# Patient Record
Sex: MALE | Race: WHITE | Employment: OTHER | URBAN - METROPOLITAN AREA
[De-identification: names, ages, dates, MRNs, and addresses within clinical notes are randomized per-mention and may not be internally consistent; named-entity substitution may affect disease eponyms.]

---

## 2017-08-07 ENCOUNTER — HOSPITAL ENCOUNTER (EMERGENCY)
Age: 42
Discharge: HOME | End: 2017-08-07
Attending: EMERGENCY MEDICINE
Payer: MEDICAID

## 2017-08-07 ENCOUNTER — APPOINTMENT (OUTPATIENT)
Dept: GENERAL RADIOLOGY | Age: 42
End: 2017-08-07
Payer: MEDICAID

## 2017-08-07 VITALS
RESPIRATION RATE: 16 BRPM | WEIGHT: 213 LBS | OXYGEN SATURATION: 98 % | DIASTOLIC BLOOD PRESSURE: 88 MMHG | HEIGHT: 70 IN | BODY MASS INDEX: 30.49 KG/M2 | TEMPERATURE: 98.2 F | HEART RATE: 63 BPM | SYSTOLIC BLOOD PRESSURE: 113 MMHG

## 2017-08-07 DIAGNOSIS — S62.102A LEFT WRIST FRACTURE, CLOSED, INITIAL ENCOUNTER: Primary | ICD-10-CM

## 2017-08-07 PROCEDURE — 99283 EMERGENCY DEPT VISIT LOW MDM: CPT

## 2017-08-07 PROCEDURE — 73110 X-RAY EXAM OF WRIST: CPT

## 2017-08-07 PROCEDURE — 29125 APPL SHORT ARM SPLINT STATIC: CPT

## 2017-08-07 RX ORDER — IBUPROFEN 800 MG/1
800 TABLET ORAL EVERY 8 HOURS PRN
Qty: 30 TABLET | Refills: 0 | Status: SHIPPED | OUTPATIENT
Start: 2017-08-07 | End: 2018-06-20 | Stop reason: SDUPTHER

## 2017-08-07 ASSESSMENT — PAIN SCALES - GENERAL: PAINLEVEL_OUTOF10: 7

## 2017-08-07 ASSESSMENT — PAIN DESCRIPTION - DESCRIPTORS: DESCRIPTORS: ACHING

## 2017-08-07 ASSESSMENT — PAIN DESCRIPTION - PAIN TYPE: TYPE: ACUTE PAIN

## 2017-08-07 ASSESSMENT — PAIN DESCRIPTION - FREQUENCY: FREQUENCY: CONTINUOUS

## 2017-08-07 ASSESSMENT — PAIN DESCRIPTION - ORIENTATION: ORIENTATION: LEFT

## 2017-08-07 ASSESSMENT — PAIN DESCRIPTION - LOCATION: LOCATION: WRIST

## 2018-04-30 ENCOUNTER — APPOINTMENT (OUTPATIENT)
Dept: CT IMAGING | Age: 43
End: 2018-04-30
Payer: MEDICAID

## 2018-04-30 ENCOUNTER — HOSPITAL ENCOUNTER (EMERGENCY)
Age: 43
Discharge: HOME | End: 2018-04-30
Attending: EMERGENCY MEDICINE
Payer: MEDICAID

## 2018-04-30 VITALS
WEIGHT: 200 LBS | SYSTOLIC BLOOD PRESSURE: 142 MMHG | OXYGEN SATURATION: 96 % | DIASTOLIC BLOOD PRESSURE: 76 MMHG | BODY MASS INDEX: 28.63 KG/M2 | RESPIRATION RATE: 18 BRPM | HEART RATE: 65 BPM | TEMPERATURE: 98 F | HEIGHT: 70 IN

## 2018-04-30 DIAGNOSIS — R19.7 NAUSEA VOMITING AND DIARRHEA: Primary | ICD-10-CM

## 2018-04-30 DIAGNOSIS — R11.2 NAUSEA VOMITING AND DIARRHEA: Primary | ICD-10-CM

## 2018-04-30 DIAGNOSIS — R10.9 ABDOMINAL PAIN, UNSPECIFIED ABDOMINAL LOCATION: ICD-10-CM

## 2018-04-30 LAB
ABSOLUTE EOS #: 0.1 K/UL (ref 0–0.4)
ABSOLUTE IMMATURE GRANULOCYTE: ABNORMAL K/UL (ref 0–0.3)
ABSOLUTE LYMPH #: 1.2 K/UL (ref 1–4.8)
ABSOLUTE MONO #: 0.7 K/UL (ref 0.1–1.3)
ALBUMIN SERPL-MCNC: 4 G/DL (ref 3.5–5.2)
ALBUMIN/GLOBULIN RATIO: ABNORMAL (ref 1–2.5)
ALP BLD-CCNC: 78 U/L (ref 40–129)
ALT SERPL-CCNC: 26 U/L (ref 5–41)
ANION GAP SERPL CALCULATED.3IONS-SCNC: 11 MMOL/L (ref 9–17)
AST SERPL-CCNC: 22 U/L
BASOPHILS # BLD: 1 % (ref 0–2)
BASOPHILS ABSOLUTE: 0 K/UL (ref 0–0.2)
BILIRUB SERPL-MCNC: 0.19 MG/DL (ref 0.3–1.2)
BUN BLDV-MCNC: 15 MG/DL (ref 6–20)
BUN/CREAT BLD: ABNORMAL (ref 9–20)
CALCIUM SERPL-MCNC: 8.7 MG/DL (ref 8.6–10.4)
CHLORIDE BLD-SCNC: 101 MMOL/L (ref 98–107)
CO2: 24 MMOL/L (ref 20–31)
CREAT SERPL-MCNC: 0.79 MG/DL (ref 0.7–1.2)
DIFFERENTIAL TYPE: ABNORMAL
EOSINOPHILS RELATIVE PERCENT: 1 % (ref 0–4)
GFR AFRICAN AMERICAN: >60 ML/MIN
GFR NON-AFRICAN AMERICAN: >60 ML/MIN
GFR SERPL CREATININE-BSD FRML MDRD: ABNORMAL ML/MIN/{1.73_M2}
GFR SERPL CREATININE-BSD FRML MDRD: ABNORMAL ML/MIN/{1.73_M2}
GLUCOSE BLD-MCNC: 125 MG/DL (ref 70–99)
HCT VFR BLD CALC: 48.6 % (ref 41–53)
HEMOGLOBIN: 17.1 G/DL (ref 13.5–17.5)
IMMATURE GRANULOCYTES: ABNORMAL %
LIPASE: 17 U/L (ref 13–60)
LYMPHOCYTES # BLD: 16 % (ref 24–44)
MCH RBC QN AUTO: 33.2 PG (ref 26–34)
MCHC RBC AUTO-ENTMCNC: 35.3 G/DL (ref 31–37)
MCV RBC AUTO: 94.1 FL (ref 80–100)
MONOCYTES # BLD: 10 % (ref 1–7)
NRBC AUTOMATED: ABNORMAL PER 100 WBC
PDW BLD-RTO: 12.9 % (ref 11.5–14.9)
PLATELET # BLD: 171 K/UL (ref 150–450)
PLATELET ESTIMATE: ABNORMAL
PMV BLD AUTO: 10.1 FL (ref 6–12)
POTASSIUM SERPL-SCNC: 3.4 MMOL/L (ref 3.7–5.3)
RBC # BLD: 5.16 M/UL (ref 4.5–5.9)
RBC # BLD: ABNORMAL 10*6/UL
SEG NEUTROPHILS: 72 % (ref 36–66)
SEGMENTED NEUTROPHILS ABSOLUTE COUNT: 5.4 K/UL (ref 1.3–9.1)
SODIUM BLD-SCNC: 136 MMOL/L (ref 135–144)
TOTAL PROTEIN: 7.3 G/DL (ref 6.4–8.3)
WBC # BLD: 7.3 K/UL (ref 3.5–11)
WBC # BLD: ABNORMAL 10*3/UL

## 2018-04-30 PROCEDURE — 6360000002 HC RX W HCPCS: Performed by: EMERGENCY MEDICINE

## 2018-04-30 PROCEDURE — 96374 THER/PROPH/DIAG INJ IV PUSH: CPT

## 2018-04-30 PROCEDURE — 36415 COLL VENOUS BLD VENIPUNCTURE: CPT

## 2018-04-30 PROCEDURE — 83690 ASSAY OF LIPASE: CPT

## 2018-04-30 PROCEDURE — 99284 EMERGENCY DEPT VISIT MOD MDM: CPT

## 2018-04-30 PROCEDURE — 85025 COMPLETE CBC W/AUTO DIFF WBC: CPT

## 2018-04-30 PROCEDURE — 74176 CT ABD & PELVIS W/O CONTRAST: CPT

## 2018-04-30 PROCEDURE — 80053 COMPREHEN METABOLIC PANEL: CPT

## 2018-04-30 PROCEDURE — 96375 TX/PRO/DX INJ NEW DRUG ADDON: CPT

## 2018-04-30 RX ORDER — ONDANSETRON 4 MG/1
4 TABLET, ORALLY DISINTEGRATING ORAL EVERY 8 HOURS PRN
Qty: 10 TABLET | Refills: 0 | Status: SHIPPED | OUTPATIENT
Start: 2018-04-30

## 2018-04-30 RX ORDER — DICYCLOMINE HCL 20 MG
20 TABLET ORAL EVERY 6 HOURS
Qty: 15 TABLET | Refills: 0 | Status: SHIPPED | OUTPATIENT
Start: 2018-04-30

## 2018-04-30 RX ORDER — FUROSEMIDE 20 MG/1
20 TABLET ORAL DAILY
COMMUNITY

## 2018-04-30 RX ORDER — DICYCLOMINE HYDROCHLORIDE 10 MG/ML
20 INJECTION INTRAMUSCULAR ONCE
Status: COMPLETED | OUTPATIENT
Start: 2018-04-30 | End: 2018-04-30

## 2018-04-30 RX ORDER — PHENYTOIN SODIUM 100 MG/1
100 CAPSULE, EXTENDED RELEASE ORAL 2 TIMES DAILY
COMMUNITY
End: 2019-05-24

## 2018-04-30 RX ORDER — KETOROLAC TROMETHAMINE 30 MG/ML
30 INJECTION, SOLUTION INTRAMUSCULAR; INTRAVENOUS ONCE
Status: COMPLETED | OUTPATIENT
Start: 2018-04-30 | End: 2018-04-30

## 2018-04-30 RX ORDER — DIVALPROEX SODIUM 500 MG/1
500 TABLET, DELAYED RELEASE ORAL 2 TIMES DAILY
COMMUNITY
End: 2019-05-24 | Stop reason: SDUPTHER

## 2018-04-30 RX ADMIN — DICYCLOMINE HYDROCHLORIDE 20 MG: 20 INJECTION, SOLUTION INTRAMUSCULAR at 15:33

## 2018-04-30 RX ADMIN — KETOROLAC TROMETHAMINE 30 MG: 30 INJECTION, SOLUTION INTRAMUSCULAR at 15:33

## 2018-04-30 ASSESSMENT — PAIN DESCRIPTION - LOCATION: LOCATION: ABDOMEN

## 2018-04-30 ASSESSMENT — ENCOUNTER SYMPTOMS
SHORTNESS OF BREATH: 0
EYE DISCHARGE: 0
COUGH: 0
EYE REDNESS: 0
ABDOMINAL PAIN: 1
RHINORRHEA: 0
VOMITING: 0
DIARRHEA: 1
EYE PAIN: 0
BACK PAIN: 0

## 2018-04-30 ASSESSMENT — PAIN DESCRIPTION - PAIN TYPE: TYPE: ACUTE PAIN

## 2018-04-30 ASSESSMENT — PAIN SCALES - GENERAL
PAINLEVEL_OUTOF10: 0
PAINLEVEL_OUTOF10: 10
PAINLEVEL_OUTOF10: 10

## 2018-06-19 ENCOUNTER — HOSPITAL ENCOUNTER (EMERGENCY)
Age: 43
Discharge: HOME | End: 2018-06-20
Attending: EMERGENCY MEDICINE
Payer: MEDICAID

## 2018-06-19 ENCOUNTER — APPOINTMENT (OUTPATIENT)
Dept: GENERAL RADIOLOGY | Age: 43
End: 2018-06-19
Payer: MEDICAID

## 2018-06-19 VITALS
HEIGHT: 70 IN | HEART RATE: 66 BPM | DIASTOLIC BLOOD PRESSURE: 67 MMHG | SYSTOLIC BLOOD PRESSURE: 122 MMHG | BODY MASS INDEX: 28.63 KG/M2 | TEMPERATURE: 98.2 F | OXYGEN SATURATION: 98 % | RESPIRATION RATE: 16 BRPM | WEIGHT: 200 LBS

## 2018-06-19 DIAGNOSIS — S63.259A DISLOCATION OF FINGER, INITIAL ENCOUNTER: Primary | ICD-10-CM

## 2018-06-19 PROCEDURE — 73140 X-RAY EXAM OF FINGER(S): CPT

## 2018-06-19 PROCEDURE — 26770 TREAT FINGER DISLOCATION: CPT

## 2018-06-19 PROCEDURE — 99283 EMERGENCY DEPT VISIT LOW MDM: CPT

## 2018-06-19 RX ORDER — IBUPROFEN 600 MG/1
600 TABLET ORAL EVERY 6 HOURS PRN
Status: DISCONTINUED | OUTPATIENT
Start: 2018-06-19 | End: 2018-06-20 | Stop reason: HOSPADM

## 2018-06-19 RX ORDER — HYDROCODONE BITARTRATE AND ACETAMINOPHEN 5; 325 MG/1; MG/1
1 TABLET ORAL ONCE
Status: COMPLETED | OUTPATIENT
Start: 2018-06-19 | End: 2018-06-20

## 2018-06-19 ASSESSMENT — PAIN DESCRIPTION - ORIENTATION: ORIENTATION: RIGHT

## 2018-06-19 ASSESSMENT — PAIN DESCRIPTION - LOCATION: LOCATION: FINGER (COMMENT WHICH ONE)

## 2018-06-20 ENCOUNTER — APPOINTMENT (OUTPATIENT)
Dept: GENERAL RADIOLOGY | Age: 43
End: 2018-06-20
Payer: MEDICAID

## 2018-06-20 PROCEDURE — 6370000000 HC RX 637 (ALT 250 FOR IP): Performed by: EMERGENCY MEDICINE

## 2018-06-20 PROCEDURE — 73140 X-RAY EXAM OF FINGER(S): CPT

## 2018-06-20 PROCEDURE — 2500000003 HC RX 250 WO HCPCS: Performed by: EMERGENCY MEDICINE

## 2018-06-20 RX ORDER — LIDOCAINE HYDROCHLORIDE 20 MG/ML
5 INJECTION, SOLUTION INFILTRATION; PERINEURAL ONCE
Status: COMPLETED | OUTPATIENT
Start: 2018-06-20 | End: 2018-06-20

## 2018-06-20 RX ORDER — IBUPROFEN 600 MG/1
600 TABLET ORAL EVERY 6 HOURS PRN
Qty: 40 TABLET | Refills: 0 | Status: SHIPPED | OUTPATIENT
Start: 2018-06-20 | End: 2021-04-10

## 2018-06-20 RX ADMIN — IBUPROFEN 600 MG: 600 TABLET ORAL at 00:16

## 2018-06-20 RX ADMIN — HYDROCODONE BITARTRATE AND ACETAMINOPHEN 1 TABLET: 5; 325 TABLET ORAL at 00:16

## 2018-06-20 RX ADMIN — LIDOCAINE HYDROCHLORIDE 5 ML: 20 INJECTION, SOLUTION INFILTRATION; PERINEURAL at 00:22

## 2018-06-20 ASSESSMENT — PAIN SCALES - GENERAL
PAINLEVEL_OUTOF10: 8
PAINLEVEL_OUTOF10: 2

## 2019-03-19 ENCOUNTER — HOSPITAL ENCOUNTER (OUTPATIENT)
Age: 44
Setting detail: SPECIMEN
Discharge: HOME | End: 2019-03-19
Payer: MEDICAID

## 2019-03-19 LAB
ABSOLUTE EOS #: 0.16 K/UL (ref 0–0.44)
ABSOLUTE IMMATURE GRANULOCYTE: <0.03 K/UL (ref 0–0.3)
ABSOLUTE LYMPH #: 1.76 K/UL (ref 1.1–3.7)
ABSOLUTE MONO #: 0.63 K/UL (ref 0.1–1.2)
BASOPHILS # BLD: 1 % (ref 0–2)
BASOPHILS ABSOLUTE: 0.03 K/UL (ref 0–0.2)
DIFFERENTIAL TYPE: NORMAL
EOSINOPHILS RELATIVE PERCENT: 3 % (ref 1–4)
HCT VFR BLD CALC: 46 % (ref 40.7–50.3)
HEMOGLOBIN: 15.3 G/DL (ref 13–17)
IMMATURE GRANULOCYTES: 0 %
LYMPHOCYTES # BLD: 29 % (ref 24–43)
MCH RBC QN AUTO: 32.1 PG (ref 25.2–33.5)
MCHC RBC AUTO-ENTMCNC: 33.3 G/DL (ref 28.4–34.8)
MCV RBC AUTO: 96.4 FL (ref 82.6–102.9)
MONOCYTES # BLD: 11 % (ref 3–12)
NRBC AUTOMATED: 0 PER 100 WBC
PDW BLD-RTO: 12.3 % (ref 11.8–14.4)
PLATELET # BLD: 187 K/UL (ref 138–453)
PLATELET ESTIMATE: NORMAL
PMV BLD AUTO: 12 FL (ref 8.1–13.5)
RBC # BLD: 4.77 M/UL (ref 4.21–5.77)
RBC # BLD: NORMAL 10*6/UL
SEG NEUTROPHILS: 56 % (ref 36–65)
SEGMENTED NEUTROPHILS ABSOLUTE COUNT: 3.39 K/UL (ref 1.5–8.1)
WBC # BLD: 6 K/UL (ref 3.5–11.3)
WBC # BLD: NORMAL 10*3/UL

## 2019-03-20 LAB
ALBUMIN SERPL-MCNC: 4.1 G/DL (ref 3.5–5.2)
ALBUMIN/GLOBULIN RATIO: 1.4 (ref 1–2.5)
ALP BLD-CCNC: 88 U/L (ref 40–129)
ALT SERPL-CCNC: 23 U/L (ref 5–41)
ANION GAP SERPL CALCULATED.3IONS-SCNC: 11 MMOL/L (ref 9–17)
AST SERPL-CCNC: 32 U/L
BILIRUB SERPL-MCNC: 0.27 MG/DL (ref 0.3–1.2)
BUN BLDV-MCNC: 19 MG/DL (ref 6–20)
BUN/CREAT BLD: ABNORMAL (ref 9–20)
CALCIUM SERPL-MCNC: 8.9 MG/DL (ref 8.6–10.4)
CHLORIDE BLD-SCNC: 106 MMOL/L (ref 98–107)
CHOLESTEROL/HDL RATIO: 4.4
CHOLESTEROL: 193 MG/DL
CO2: 22 MMOL/L (ref 20–31)
CREAT SERPL-MCNC: 0.72 MG/DL (ref 0.7–1.2)
ESTIMATED AVERAGE GLUCOSE: 105 MG/DL
GFR AFRICAN AMERICAN: >60 ML/MIN
GFR NON-AFRICAN AMERICAN: >60 ML/MIN
GFR SERPL CREATININE-BSD FRML MDRD: ABNORMAL ML/MIN/{1.73_M2}
GFR SERPL CREATININE-BSD FRML MDRD: ABNORMAL ML/MIN/{1.73_M2}
GLUCOSE BLD-MCNC: 94 MG/DL (ref 70–99)
HBA1C MFR BLD: 5.3 % (ref 4–6)
HDLC SERPL-MCNC: 44 MG/DL
LDL CHOLESTEROL: 133 MG/DL (ref 0–130)
PHENYTOIN DATE LAST DOSE: ABNORMAL
PHENYTOIN DOSE AMOUNT: ABNORMAL
PHENYTOIN DOSE TIME: ABNORMAL
PHENYTOIN FREE: <0.1 UG/ML (ref 1–2)
PHENYTOIN LEVEL: 2.2 UG/ML (ref 10–20)
POTASSIUM SERPL-SCNC: 4.6 MMOL/L (ref 3.7–5.3)
SODIUM BLD-SCNC: 139 MMOL/L (ref 135–144)
TOTAL PROTEIN: 7.1 G/DL (ref 6.4–8.3)
TRIGL SERPL-MCNC: 78 MG/DL
VLDLC SERPL CALC-MCNC: ABNORMAL MG/DL (ref 1–30)

## 2019-03-21 LAB — MAGNESIUM: 2.3 MG/DL (ref 1.6–2.6)

## 2019-05-06 ENCOUNTER — HOSPITAL ENCOUNTER (OUTPATIENT)
Age: 44
Discharge: HOME | End: 2019-05-06
Payer: MEDICAID

## 2019-05-06 LAB
PHENYTOIN DATE LAST DOSE: ABNORMAL
PHENYTOIN DOSE AMOUNT: ABNORMAL
PHENYTOIN DOSE TIME: ABNORMAL
PHENYTOIN FREE: 0.4 UG/ML (ref 1–2)
PHENYTOIN LEVEL: 3.3 UG/ML (ref 10–20)

## 2019-05-06 PROCEDURE — 80186 ASSAY OF PHENYTOIN FREE: CPT

## 2019-05-06 PROCEDURE — 80185 ASSAY OF PHENYTOIN TOTAL: CPT

## 2019-05-17 ENCOUNTER — APPOINTMENT (OUTPATIENT)
Dept: GENERAL RADIOLOGY | Age: 44
End: 2019-05-17
Payer: MEDICAID

## 2019-05-17 ENCOUNTER — HOSPITAL ENCOUNTER (EMERGENCY)
Age: 44
Discharge: HOME | End: 2019-05-17
Attending: EMERGENCY MEDICINE
Payer: MEDICAID

## 2019-05-17 VITALS
HEART RATE: 60 BPM | RESPIRATION RATE: 18 BRPM | WEIGHT: 182 LBS | OXYGEN SATURATION: 96 % | BODY MASS INDEX: 26.05 KG/M2 | HEIGHT: 70 IN | DIASTOLIC BLOOD PRESSURE: 80 MMHG | SYSTOLIC BLOOD PRESSURE: 120 MMHG | TEMPERATURE: 98 F

## 2019-05-17 DIAGNOSIS — R07.81 RIB PAIN ON RIGHT SIDE: Primary | ICD-10-CM

## 2019-05-17 PROCEDURE — 71101 X-RAY EXAM UNILAT RIBS/CHEST: CPT

## 2019-05-17 PROCEDURE — 6370000000 HC RX 637 (ALT 250 FOR IP): Performed by: EMERGENCY MEDICINE

## 2019-05-17 PROCEDURE — 99283 EMERGENCY DEPT VISIT LOW MDM: CPT

## 2019-05-17 RX ORDER — IBUPROFEN 800 MG/1
800 TABLET ORAL EVERY 8 HOURS PRN
Qty: 30 TABLET | Refills: 0 | Status: SHIPPED | OUTPATIENT
Start: 2019-05-17 | End: 2021-04-10

## 2019-05-17 RX ORDER — ACETAMINOPHEN 325 MG/1
650 TABLET ORAL EVERY 6 HOURS PRN
Qty: 30 TABLET | Refills: 0 | Status: SHIPPED | OUTPATIENT
Start: 2019-05-17 | End: 2021-04-10

## 2019-05-17 RX ORDER — ACETAMINOPHEN 325 MG/1
650 TABLET ORAL ONCE
Status: COMPLETED | OUTPATIENT
Start: 2019-05-17 | End: 2019-05-17

## 2019-05-17 RX ORDER — IBUPROFEN 800 MG/1
800 TABLET ORAL ONCE
Status: COMPLETED | OUTPATIENT
Start: 2019-05-17 | End: 2019-05-17

## 2019-05-17 RX ADMIN — IBUPROFEN 800 MG: 800 TABLET ORAL at 04:29

## 2019-05-17 RX ADMIN — ACETAMINOPHEN 650 MG: 325 TABLET ORAL at 04:29

## 2019-05-17 ASSESSMENT — PAIN DESCRIPTION - ORIENTATION: ORIENTATION: RIGHT

## 2019-05-17 ASSESSMENT — PAIN SCALES - GENERAL
PAINLEVEL_OUTOF10: 9
PAINLEVEL_OUTOF10: 8
PAINLEVEL_OUTOF10: 10
PAINLEVEL_OUTOF10: 10

## 2019-05-17 ASSESSMENT — ENCOUNTER SYMPTOMS
BACK PAIN: 0
COLOR CHANGE: 0
NAUSEA: 0
VOMITING: 0
CHEST TIGHTNESS: 0
ABDOMINAL PAIN: 0
SHORTNESS OF BREATH: 0

## 2019-05-17 ASSESSMENT — PAIN - FUNCTIONAL ASSESSMENT: PAIN_FUNCTIONAL_ASSESSMENT: 0-10

## 2019-05-17 ASSESSMENT — PAIN DESCRIPTION - PAIN TYPE: TYPE: ACUTE PAIN

## 2019-05-17 ASSESSMENT — PAIN DESCRIPTION - LOCATION: LOCATION: RIB CAGE
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
## 2019-05-24 ENCOUNTER — OFFICE VISIT (OUTPATIENT)
Dept: NEUROLOGY | Age: 44
End: 2019-05-24
Payer: MEDICAID

## 2019-05-24 VITALS
BODY MASS INDEX: 27.55 KG/M2 | DIASTOLIC BLOOD PRESSURE: 84 MMHG | HEART RATE: 88 BPM | SYSTOLIC BLOOD PRESSURE: 136 MMHG | WEIGHT: 192 LBS

## 2019-05-24 DIAGNOSIS — G40.909 SEIZURE DISORDER (HCC): Primary | ICD-10-CM

## 2019-05-24 DIAGNOSIS — F17.200 SMOKER: ICD-10-CM

## 2019-05-24 DIAGNOSIS — Z91.14 NON COMPLIANCE W MEDICATION REGIMEN: ICD-10-CM

## 2019-05-24 DIAGNOSIS — F32.9 MAJOR DEPRESSIVE DISORDER WITH CURRENT ACTIVE EPISODE, UNSPECIFIED DEPRESSION EPISODE SEVERITY, UNSPECIFIED WHETHER RECURRENT: ICD-10-CM

## 2019-05-24 PROCEDURE — 99205 OFFICE O/P NEW HI 60 MIN: CPT | Performed by: STUDENT IN AN ORGANIZED HEALTH CARE EDUCATION/TRAINING PROGRAM

## 2019-05-24 PROCEDURE — G8427 DOCREV CUR MEDS BY ELIG CLIN: HCPCS | Performed by: STUDENT IN AN ORGANIZED HEALTH CARE EDUCATION/TRAINING PROGRAM

## 2019-05-24 PROCEDURE — 4004F PT TOBACCO SCREEN RCVD TLK: CPT | Performed by: STUDENT IN AN ORGANIZED HEALTH CARE EDUCATION/TRAINING PROGRAM

## 2019-05-24 PROCEDURE — G8419 CALC BMI OUT NRM PARAM NOF/U: HCPCS | Performed by: STUDENT IN AN ORGANIZED HEALTH CARE EDUCATION/TRAINING PROGRAM

## 2019-05-24 RX ORDER — PHENYTOIN SODIUM 300 MG/1
300 CAPSULE, EXTENDED RELEASE ORAL DAILY
Qty: 90 CAPSULE | Refills: 0 | Status: SHIPPED | OUTPATIENT
Start: 2019-05-24 | End: 2019-07-31 | Stop reason: SDUPTHER

## 2019-05-24 RX ORDER — DIVALPROEX SODIUM 500 MG/1
500 TABLET, DELAYED RELEASE ORAL 2 TIMES DAILY
Qty: 180 TABLET | Refills: 0 | Status: SHIPPED | OUTPATIENT
Start: 2019-05-24 | End: 2019-07-31 | Stop reason: SDUPTHER

## 2019-05-24 ASSESSMENT — ENCOUNTER SYMPTOMS
EYE PAIN: 0
CONSTIPATION: 0
NAUSEA: 0
SHORTNESS OF BREATH: 0
COUGH: 0
VOMITING: 0
PHOTOPHOBIA: 0
DIARRHEA: 0
ABDOMINAL PAIN: 0
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
## 2019-07-22 ENCOUNTER — HOSPITAL ENCOUNTER (OUTPATIENT)
Age: 44
Discharge: HOME | End: 2019-07-22
Payer: MEDICAID

## 2019-07-22 LAB
PHENYTOIN DATE LAST DOSE: NORMAL
PHENYTOIN DOSE AMOUNT: NORMAL
PHENYTOIN DOSE TIME: NORMAL
PHENYTOIN FREE: 1.7 UG/ML (ref 1–2)
PHENYTOIN LEVEL: 12.6 UG/ML (ref 10–20)
PROLACTIN: 4.59 UG/L (ref 4.04–15.2)

## 2019-07-22 PROCEDURE — 84146 ASSAY OF PROLACTIN: CPT

## 2019-07-22 PROCEDURE — 80186 ASSAY OF PHENYTOIN FREE: CPT

## 2019-07-22 PROCEDURE — 36415 COLL VENOUS BLD VENIPUNCTURE: CPT

## 2019-07-22 PROCEDURE — 80185 ASSAY OF PHENYTOIN TOTAL: CPT

## 2019-07-31 ENCOUNTER — HOSPITAL ENCOUNTER (OUTPATIENT)
Age: 44
Discharge: HOME | End: 2019-07-31
Payer: MEDICAID

## 2019-07-31 ENCOUNTER — OFFICE VISIT (OUTPATIENT)
Dept: NEUROLOGY | Age: 44
End: 2019-07-31
Payer: MEDICAID

## 2019-07-31 VITALS — DIASTOLIC BLOOD PRESSURE: 68 MMHG | SYSTOLIC BLOOD PRESSURE: 106 MMHG | HEART RATE: 72 BPM

## 2019-07-31 DIAGNOSIS — F32.9 MAJOR DEPRESSIVE DISORDER WITH CURRENT ACTIVE EPISODE, UNSPECIFIED DEPRESSION EPISODE SEVERITY, UNSPECIFIED WHETHER RECURRENT: ICD-10-CM

## 2019-07-31 DIAGNOSIS — G40.909 SEIZURE DISORDER (HCC): ICD-10-CM

## 2019-07-31 DIAGNOSIS — Z91.14 NON COMPLIANCE W MEDICATION REGIMEN: ICD-10-CM

## 2019-07-31 DIAGNOSIS — F17.200 SMOKER: ICD-10-CM

## 2019-07-31 DIAGNOSIS — G40.909 SEIZURE DISORDER (HCC): Primary | ICD-10-CM

## 2019-07-31 LAB
PHENYTOIN DATE LAST DOSE: NORMAL
PHENYTOIN DOSE AMOUNT: NORMAL
PHENYTOIN DOSE TIME: NORMAL
PHENYTOIN FREE: 1.3 UG/ML (ref 1–2)
PHENYTOIN LEVEL: 12.7 UG/ML (ref 10–20)
VALPROIC ACID LEVEL: 35 UG/ML (ref 50–125)
VALPROIC ACID, FREE: 2 UG/ML (ref 7–23)
VALPROIC DATE LAST DOSE: ABNORMAL
VALPROIC DOSE AMOUNT: ABNORMAL
VALPROIC TIME LAST DOSE: ABNORMAL

## 2019-07-31 PROCEDURE — 80186 ASSAY OF PHENYTOIN FREE: CPT

## 2019-07-31 PROCEDURE — 36415 COLL VENOUS BLD VENIPUNCTURE: CPT

## 2019-07-31 PROCEDURE — 4004F PT TOBACCO SCREEN RCVD TLK: CPT | Performed by: STUDENT IN AN ORGANIZED HEALTH CARE EDUCATION/TRAINING PROGRAM

## 2019-07-31 PROCEDURE — 99214 OFFICE O/P EST MOD 30 MIN: CPT | Performed by: STUDENT IN AN ORGANIZED HEALTH CARE EDUCATION/TRAINING PROGRAM

## 2019-07-31 PROCEDURE — 80165 DIPROPYLACETIC ACID FREE: CPT

## 2019-07-31 PROCEDURE — G8427 DOCREV CUR MEDS BY ELIG CLIN: HCPCS | Performed by: STUDENT IN AN ORGANIZED HEALTH CARE EDUCATION/TRAINING PROGRAM

## 2019-07-31 PROCEDURE — 80164 ASSAY DIPROPYLACETIC ACD TOT: CPT

## 2019-07-31 PROCEDURE — 80185 ASSAY OF PHENYTOIN TOTAL: CPT

## 2019-07-31 PROCEDURE — G8419 CALC BMI OUT NRM PARAM NOF/U: HCPCS | Performed by: STUDENT IN AN ORGANIZED HEALTH CARE EDUCATION/TRAINING PROGRAM

## 2019-07-31 RX ORDER — PHENYTOIN SODIUM 100 MG/1
CAPSULE, EXTENDED RELEASE ORAL
COMMUNITY
Start: 2019-06-10 | End: 2019-07-31 | Stop reason: SDUPTHER

## 2019-07-31 RX ORDER — PHENYTOIN SODIUM 300 MG/1
300 CAPSULE, EXTENDED RELEASE ORAL DAILY
Qty: 90 CAPSULE | Refills: 0 | Status: SHIPPED | OUTPATIENT
Start: 2019-07-31 | End: 2021-01-07 | Stop reason: SDUPTHER

## 2019-07-31 RX ORDER — DIVALPROEX SODIUM 500 MG/1
500 TABLET, DELAYED RELEASE ORAL 2 TIMES DAILY
Qty: 180 TABLET | Refills: 0 | Status: SHIPPED | OUTPATIENT
Start: 2019-07-31 | End: 2019-09-30 | Stop reason: SDUPTHER

## 2019-07-31 ASSESSMENT — ENCOUNTER SYMPTOMS
COUGH: 0
VOMITING: 0
PHOTOPHOBIA: 0
DIARRHEA: 0
CONSTIPATION: 0
EYE PAIN: 0
SHORTNESS OF BREATH: 0
ABDOMINAL PAIN: 0
NAUSEA: 0

## 2019-07-31 NOTE — PROGRESS NOTES
Cardiovascular: Negative for chest pain and palpitations. Gastrointestinal: Negative for abdominal pain, constipation, diarrhea, nausea and vomiting. Endocrine: Negative for polydipsia and polyuria. Genitourinary: Negative for difficulty urinating, dysuria and hematuria. Skin: Negative for pallor and rash. Neurological: Positive for seizures. Negative for dizziness, tremors, syncope, facial asymmetry, speech difficulty, weakness, light-headedness, numbness and headaches. Psychiatric/Behavioral: Positive for confusion, decreased concentration, dysphoric mood and sleep disturbance. Negative for behavioral problems and hallucinations. VITALS  /68 (Site: Right Upper Arm, Position: Sitting, Cuff Size: Medium Adult)   Pulse 72      PHYSICAL EXAMINATION:     Constitutional: Well developed, well nourished and in no acute distress. Head:  normocephalic, atraumatic. Neck: supple, no carotid bruits, thyroid not palpable  Respiratory: Clear to auscultation bilaterally with no use of accessory muscles during respiration. Cardiovascular: normal rate, regular rhythm, no murmur, gallop, rub.   Abdomen: Soft, nontender, nondistended, normal bowel sounds, no hepatomegaly or splenomegaly  Extremities:  peripheral pulses palpable, no pedal edema or calf pain with palpation  Psych: normal affect      NEUROLOGICAL EXAMINATION:     Mental status   Alert and oriented; impaired memory with no confusion, speech or language problems; no hallucinations or delusions     Cranial nerves   II - visual fields intact to confrontation                                                III, IV, VI - extra-ocular muscles full: no pupillary defect; no ANDRIY, no nystagmus, no ptosis   V - normal facial sensation                                                               VII - normal facial symmetry                                                             VIII - intact hearing ago.  · History of depression  · Current smoker  · Sleep disturbances  · Medication noncompliance      Plan:  - Medications:  Patient is very noncompliant with his medication. He was not clear with  dose of his medications. His phenytoin level on 7/22/2019:  Free level was 1.7 and total was 12.6. He was supposed to take phenytoin  mg once a day, as per patient he was taking phenytoin 300 mg twice a day instead of that and this was prescribed by his PCP a week before. No clear documentation of prescription of phenytoin 300 mg twice a day present in the epic chart. Called the patient's pharmacy to confirm the dosing it stated 300 mg daily. Patient denies any signs of toxicity at present. We will recheck the levels today. Continue Valproic acid  mg twice a day, will recheck his levels of valproic acid too. - Patient did not have any seizure for more than 10 years, may consider MRI brain  if seizure recurs. He already has abnormal EEG in the past.    - Seizure Precautions:   I counseled the patient with regard to seizure precautions for injury prevention and reinforced their rationale. No driving for at least 6 months, no activity at dangerous heights, no operation of dangerous machinery, no baths, no swimming. Caution (preferably accompanied) with cooking or near fires. The patient expressed understanding.     - Maintain seizure diary  a. seizures that occur, duration and description (or type)  b. any side effects from medications and medication dosage  c. mood or behavioral changes  d. Changes in other medications, or Significant life events    - Follow up in the clinic in 6 weeks  - Instructed patient to call the clinic if symptoms worsen or develop any new symptoms. I have spent 25 minutes face to face with the patient more than 50% of this time was spent counseling on seizure precautions, medication compliance and smoking cessation and coordinating care.       Electronically

## 2019-08-01 ENCOUNTER — TELEPHONE (OUTPATIENT)
Dept: NEUROLOGY | Age: 44
End: 2019-08-01

## 2019-08-05 ENCOUNTER — TELEPHONE (OUTPATIENT)
Dept: NEUROSURGERY | Age: 44
End: 2019-08-05

## 2019-08-05 NOTE — TELEPHONE ENCOUNTER
Patient called in with girlfriend on the line as well, stated patient is having chest pain on the left side , feel like \" an elephant is sitting on my chest\" patient advised to go to ER.  Patient verbalized understanding.,

## 2019-09-20 ENCOUNTER — TELEPHONE (OUTPATIENT)
Dept: NEUROSURGERY | Age: 44
End: 2019-09-20

## 2019-09-27 ENCOUNTER — HOSPITAL ENCOUNTER (OUTPATIENT)
Age: 44
Discharge: HOME | End: 2019-09-27
Payer: MEDICAID

## 2019-09-27 ENCOUNTER — OFFICE VISIT (OUTPATIENT)
Dept: NEUROLOGY | Age: 44
End: 2019-09-27
Payer: MEDICAID

## 2019-09-27 VITALS
WEIGHT: 190 LBS | BODY MASS INDEX: 27.26 KG/M2 | DIASTOLIC BLOOD PRESSURE: 69 MMHG | SYSTOLIC BLOOD PRESSURE: 123 MMHG | HEART RATE: 73 BPM

## 2019-09-27 DIAGNOSIS — F17.200 SMOKER: ICD-10-CM

## 2019-09-27 DIAGNOSIS — G40.909 SEIZURE DISORDER (HCC): ICD-10-CM

## 2019-09-27 DIAGNOSIS — G40.909 SEIZURE DISORDER (HCC): Primary | ICD-10-CM

## 2019-09-27 DIAGNOSIS — Z91.14 NON COMPLIANCE W MEDICATION REGIMEN: ICD-10-CM

## 2019-09-27 DIAGNOSIS — F32.9 MAJOR DEPRESSIVE DISORDER WITH CURRENT ACTIVE EPISODE, UNSPECIFIED DEPRESSION EPISODE SEVERITY, UNSPECIFIED WHETHER RECURRENT: ICD-10-CM

## 2019-09-27 LAB
VALPROIC ACID LEVEL: 39 UG/ML (ref 50–125)
VALPROIC ACID, FREE: 3.5 UG/ML (ref 7–23)
VALPROIC DATE LAST DOSE: ABNORMAL
VALPROIC DOSE AMOUNT: ABNORMAL
VALPROIC TIME LAST DOSE: ABNORMAL

## 2019-09-27 PROCEDURE — G8419 CALC BMI OUT NRM PARAM NOF/U: HCPCS | Performed by: STUDENT IN AN ORGANIZED HEALTH CARE EDUCATION/TRAINING PROGRAM

## 2019-09-27 PROCEDURE — 80164 ASSAY DIPROPYLACETIC ACD TOT: CPT

## 2019-09-27 PROCEDURE — 99214 OFFICE O/P EST MOD 30 MIN: CPT | Performed by: STUDENT IN AN ORGANIZED HEALTH CARE EDUCATION/TRAINING PROGRAM

## 2019-09-27 PROCEDURE — 36415 COLL VENOUS BLD VENIPUNCTURE: CPT

## 2019-09-27 PROCEDURE — G8427 DOCREV CUR MEDS BY ELIG CLIN: HCPCS | Performed by: STUDENT IN AN ORGANIZED HEALTH CARE EDUCATION/TRAINING PROGRAM

## 2019-09-27 PROCEDURE — 4004F PT TOBACCO SCREEN RCVD TLK: CPT | Performed by: STUDENT IN AN ORGANIZED HEALTH CARE EDUCATION/TRAINING PROGRAM

## 2019-09-27 PROCEDURE — 80165 DIPROPYLACETIC ACID FREE: CPT

## 2019-09-27 ASSESSMENT — ENCOUNTER SYMPTOMS
NAUSEA: 0
EYE PAIN: 0
SHORTNESS OF BREATH: 0
CONSTIPATION: 0
ABDOMINAL PAIN: 0
DIARRHEA: 0
PHOTOPHOBIA: 0
VOMITING: 0
COUGH: 0
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
## 2019-09-30 ENCOUNTER — TELEPHONE (OUTPATIENT)
Dept: NEUROLOGY | Age: 44
End: 2019-09-30

## 2019-09-30 RX ORDER — DIVALPROEX SODIUM 250 MG/1
750 TABLET, DELAYED RELEASE ORAL 2 TIMES DAILY
Qty: 180 TABLET | Refills: 1 | Status: SHIPPED | OUTPATIENT
Start: 2019-09-30 | End: 2019-10-01

## 2019-10-01 RX ORDER — DIVALPROEX SODIUM 250 MG/1
250 TABLET, DELAYED RELEASE ORAL 2 TIMES DAILY
Qty: 60 TABLET | Refills: 1 | Status: SHIPPED | OUTPATIENT
Start: 2019-10-01 | End: 2021-01-07 | Stop reason: SDUPTHER

## 2019-10-01 RX ORDER — DIVALPROEX SODIUM 500 MG/1
500 TABLET, DELAYED RELEASE ORAL 2 TIMES DAILY
Qty: 60 TABLET | Refills: 1 | Status: SHIPPED | OUTPATIENT
Start: 2019-10-01 | End: 2021-01-07 | Stop reason: SDUPTHER

## 2020-01-26 ENCOUNTER — HOSPITAL ENCOUNTER (EMERGENCY)
Age: 45
Discharge: HOME | End: 2020-01-26
Attending: EMERGENCY MEDICINE
Payer: MEDICAID

## 2020-01-26 VITALS
BODY MASS INDEX: 25.77 KG/M2 | RESPIRATION RATE: 16 BRPM | TEMPERATURE: 98.1 F | DIASTOLIC BLOOD PRESSURE: 71 MMHG | OXYGEN SATURATION: 98 % | HEART RATE: 60 BPM | HEIGHT: 70 IN | WEIGHT: 180 LBS | SYSTOLIC BLOOD PRESSURE: 124 MMHG

## 2020-01-26 PROCEDURE — 99282 EMERGENCY DEPT VISIT SF MDM: CPT

## 2020-01-26 RX ORDER — PENICILLIN V POTASSIUM 500 MG/1
500 TABLET ORAL 4 TIMES DAILY
Qty: 40 TABLET | Refills: 0 | Status: SHIPPED | OUTPATIENT
Start: 2020-01-26 | End: 2022-07-11

## 2020-01-26 RX ORDER — IBUPROFEN 600 MG/1
600 TABLET ORAL EVERY 6 HOURS PRN
Qty: 30 TABLET | Refills: 0 | Status: SHIPPED | OUTPATIENT
Start: 2020-01-26 | End: 2021-04-10

## 2020-01-26 RX ORDER — CHLORHEXIDINE GLUCONATE 0.12 MG/ML
15 RINSE ORAL 2 TIMES DAILY
Qty: 420 ML | Refills: 0 | Status: SHIPPED | OUTPATIENT
Start: 2020-01-26 | End: 2020-02-09

## 2020-01-26 ASSESSMENT — PAIN SCALES - GENERAL: PAINLEVEL_OUTOF10: 10

## 2020-01-26 ASSESSMENT — PAIN DESCRIPTION - PAIN TYPE: TYPE: ACUTE PAIN

## 2020-01-26 ASSESSMENT — PAIN DESCRIPTION - LOCATION: LOCATION: TEETH

## 2020-01-26 ASSESSMENT — PAIN DESCRIPTION - DESCRIPTORS: DESCRIPTORS: ACHING

## 2020-01-26 ASSESSMENT — PAIN DESCRIPTION - ORIENTATION: ORIENTATION: UPPER;RIGHT

## 2020-01-26 ASSESSMENT — ENCOUNTER SYMPTOMS: FACIAL SWELLING: 0

## 2020-01-26 NOTE — ED PROVIDER NOTES
[address] ED  eMERGENCY dEPARTMENT eNCOUnter      Pt Name: Rufina Hough  MRN: [id number]  [place] 1975  Date of evaluation: 1/26/20      CHIEF COMPLAINT       Chief Complaint   Patient presents with    Dental Pain         HISTORY OF PRESENT ILLNESS    Rufina Hough is a 40 y.o. male who presents complaining of dental pain right upper  The history is provided by the patient. Dental Pain   Location:  Lower  Quality:  Aching  Severity:  Moderate  Onset quality:  Sudden  Duration:  1 day  Timing:  Intermittent  Progression:  Waxing and waning  Chronicity:  New  Context: dental caries    Relieved by:  Nothing  Worsened by:  Nothing  Ineffective treatments:  None tried  Associated symptoms: no facial pain, no facial swelling, no neck swelling and no oral bleeding    Risk factors: lack of dental care and smoking        REVIEW OF SYSTEMS       Review of Systems   HENT: Positive for dental problem. Negative for facial swelling. All other systems reviewed and are negative. PAST MEDICAL HISTORY     Past Medical History:   Diagnosis Date    Seizures [facility])        SURGICAL HISTORY     History reviewed. No pertinent surgical history.     CURRENT MEDICATIONS       Previous Medications    ACETAMINOPHEN (TYLENOL) 325 MG TABLET    Take 2 tablets by mouth every 6 hours as needed for Pain    DICYCLOMINE (BENTYL) 20 MG TABLET    Take 1 tablet by mouth every 6 hours    DIVALPROEX (DEPAKOTE) 250 MG DR TABLET    Take 1 tablet by mouth 2 times daily    DIVALPROEX (DEPAKOTE) 500 MG DR TABLET    Take 1 tablet by mouth 2 times daily    FUROSEMIDE (LASIX) 20 MG TABLET    Take 20 mg by mouth daily    IBUPROFEN (ADVIL;MOTRIN) 800 MG TABLET    Take 1 tablet by mouth every 8 hours as needed for Pain    IBUPROFEN (IBU) 600 MG TABLET    Take 1 tablet by mouth every 6 hours as needed for Pain    ONDANSETRON (ZOFRAN ODT) 4 MG DISINTEGRATING TABLET    Take 1 tablet by mouth every 8 hours as needed for Nausea    PHENYTOIN (PHENYTEK) 300 MG ER

## 2020-01-26 NOTE — ED TRIAGE NOTES
Mode of arrival (squad #, walk in, police, etc) : walk-in        Chief complaint(s): tooth pain        Arrival Note (brief scenario, treatment PTA, etc).: Pt. States that yesterday he felt that his tooth was loose and pushed on it and part of the tooth fell out. Pt. States it is a back upper right tooth and it is causing him a lot of pain. Pt. Rates the pain 10/10 and describes it as aching. Pt. States that he took tylenol last night and this morning and it is not helping with the pain        C= \"Have you ever felt that you should Cut down on your drinking? \"  No  A= \"Have people Annoyed you by criticizing your drinking? \"  No  G= \"Have you ever felt bad or Guilty about your drinking? \"  No  E= \"Have you ever had a drink as an Eye-opener first thing in the morning to steady your nerves or to help a hangover? \"  No      Deferred []      Reason for deferring: N/A    *If yes to two or more: probable alcohol abuse. *

## 2020-05-07 ENCOUNTER — HOSPITAL ENCOUNTER (OUTPATIENT)
Age: 45
Setting detail: SPECIMEN
Discharge: HOME | End: 2020-05-07
Payer: MEDICAID

## 2020-05-07 LAB
ABSOLUTE EOS #: 0.16 K/UL (ref 0–0.44)
ABSOLUTE EOS #: NORMAL K/UL
ABSOLUTE IMMATURE GRANULOCYTE: 0.03 K/UL (ref 0–0.3)
ABSOLUTE IMMATURE GRANULOCYTE: NORMAL K/UL (ref 0–0.3)
ABSOLUTE LYMPH #: 2.21 K/UL (ref 1.1–3.7)
ABSOLUTE LYMPH #: NORMAL K/UL
ABSOLUTE MONO #: 0.64 K/UL (ref 0.1–1.2)
ABSOLUTE MONO #: NORMAL K/UL
ALBUMIN SERPL-MCNC: 4.1 G/DL (ref 3.5–5.2)
ALBUMIN/GLOBULIN RATIO: 1.2 (ref 1–2.5)
ALP BLD-CCNC: 96 U/L (ref 40–129)
ALT SERPL-CCNC: 28 U/L (ref 5–41)
ANION GAP SERPL CALCULATED.3IONS-SCNC: 14 MMOL/L (ref 9–17)
AST SERPL-CCNC: 16 U/L
BASOPHILS # BLD: 0 % (ref 0–2)
BASOPHILS # BLD: NORMAL %
BASOPHILS ABSOLUTE: <0.03 K/UL (ref 0–0.2)
BASOPHILS ABSOLUTE: NORMAL K/UL (ref 0–0.2)
BILIRUB SERPL-MCNC: 0.19 MG/DL (ref 0.3–1.2)
BUN BLDV-MCNC: 13 MG/DL (ref 6–20)
BUN/CREAT BLD: ABNORMAL (ref 9–20)
CALCIUM SERPL-MCNC: 9.3 MG/DL (ref 8.6–10.4)
CHLORIDE BLD-SCNC: 104 MMOL/L (ref 98–107)
CHOLESTEROL/HDL RATIO: 5.7
CHOLESTEROL: 205 MG/DL
CO2: 21 MMOL/L (ref 20–31)
CREAT SERPL-MCNC: 0.73 MG/DL (ref 0.7–1.2)
DIFFERENTIAL TYPE: ABNORMAL
DIFFERENTIAL TYPE: NORMAL
EOSINOPHILS RELATIVE PERCENT: 2 % (ref 1–4)
EOSINOPHILS RELATIVE PERCENT: NORMAL %
ESTIMATED AVERAGE GLUCOSE: 108 MG/DL
GFR AFRICAN AMERICAN: >60 ML/MIN
GFR NON-AFRICAN AMERICAN: >60 ML/MIN
GFR SERPL CREATININE-BSD FRML MDRD: ABNORMAL ML/MIN/{1.73_M2}
GFR SERPL CREATININE-BSD FRML MDRD: ABNORMAL ML/MIN/{1.73_M2}
GLUCOSE BLD-MCNC: 99 MG/DL (ref 70–99)
HBA1C MFR BLD: 5.4 % (ref 4–6)
HCT VFR BLD CALC: 49.9 % (ref 40.7–50.3)
HCT VFR BLD CALC: NORMAL %
HDLC SERPL-MCNC: 36 MG/DL
HEMOGLOBIN: 16.6 G/DL (ref 13–17)
HEMOGLOBIN: NORMAL G/DL
IMMATURE GRANULOCYTES: 1 %
IMMATURE GRANULOCYTES: NORMAL %
LDL CHOLESTEROL: 134 MG/DL (ref 0–130)
LYMPHOCYTES # BLD: 33 % (ref 24–43)
LYMPHOCYTES # BLD: NORMAL %
MCH RBC QN AUTO: 32.5 PG (ref 25.2–33.5)
MCH RBC QN AUTO: NORMAL PG
MCHC RBC AUTO-ENTMCNC: 33.3 G/DL (ref 28.4–34.8)
MCHC RBC AUTO-ENTMCNC: NORMAL G/DL
MCV RBC AUTO: 97.8 FL (ref 82.6–102.9)
MCV RBC AUTO: NORMAL FL
MONOCYTES # BLD: 10 % (ref 3–12)
MONOCYTES # BLD: NORMAL %
NRBC AUTOMATED: 0 PER 100 WBC
NRBC AUTOMATED: NORMAL PER 100 WBC
PDW BLD-RTO: 12.4 % (ref 11.8–14.4)
PDW BLD-RTO: NORMAL %
PHENYTOIN DATE LAST DOSE: ABNORMAL
PHENYTOIN DOSE AMOUNT: ABNORMAL
PHENYTOIN DOSE TIME: ABNORMAL
PHENYTOIN FREE: 0.5 UG/ML (ref 1–2)
PHENYTOIN LEVEL: 3.9 UG/ML (ref 10–20)
PLATELET # BLD: 175 K/UL (ref 138–453)
PLATELET # BLD: NORMAL K/UL
PLATELET ESTIMATE: ABNORMAL
PLATELET ESTIMATE: NORMAL
PMV BLD AUTO: 12.1 FL (ref 8.1–13.5)
PMV BLD AUTO: NORMAL FL
POTASSIUM SERPL-SCNC: 4.6 MMOL/L (ref 3.7–5.3)
RBC # BLD: 5.1 M/UL (ref 4.21–5.77)
RBC # BLD: ABNORMAL 10*6/UL
RBC # BLD: NORMAL 10*6/UL
RBC # BLD: NORMAL M/UL
SEG NEUTROPHILS: 54 % (ref 36–65)
SEG NEUTROPHILS: NORMAL %
SEGMENTED NEUTROPHILS ABSOLUTE COUNT: 3.6 K/UL (ref 1.5–8.1)
SEGMENTED NEUTROPHILS ABSOLUTE COUNT: NORMAL K/UL
SODIUM BLD-SCNC: 139 MMOL/L (ref 135–144)
TOTAL PROTEIN: 7.4 G/DL (ref 6.4–8.3)
TRIGL SERPL-MCNC: 173 MG/DL
VLDLC SERPL CALC-MCNC: ABNORMAL MG/DL (ref 1–30)
WBC # BLD: 6.7 K/UL (ref 3.5–11.3)
WBC # BLD: ABNORMAL 10*3/UL
WBC # BLD: NORMAL 10*3/UL
WBC # BLD: NORMAL K/UL

## 2021-01-07 ENCOUNTER — HOSPITAL ENCOUNTER (OUTPATIENT)
Age: 46
Discharge: HOME | End: 2021-01-07
Payer: MEDICAID

## 2021-01-07 ENCOUNTER — OFFICE VISIT (OUTPATIENT)
Dept: NEUROLOGY | Age: 46
End: 2021-01-07
Payer: MEDICAID

## 2021-01-07 VITALS
WEIGHT: 215.2 LBS | DIASTOLIC BLOOD PRESSURE: 82 MMHG | HEART RATE: 67 BPM | SYSTOLIC BLOOD PRESSURE: 139 MMHG | BODY MASS INDEX: 30.88 KG/M2

## 2021-01-07 DIAGNOSIS — F17.200 CURRENT SMOKER: ICD-10-CM

## 2021-01-07 DIAGNOSIS — Z91.14 H/O MEDICATION NONCOMPLIANCE: ICD-10-CM

## 2021-01-07 DIAGNOSIS — G47.9 SLEEP DISTURBANCES: ICD-10-CM

## 2021-01-07 DIAGNOSIS — Z86.59 HISTORY OF DEPRESSION: ICD-10-CM

## 2021-01-07 DIAGNOSIS — G40.909 SEIZURE DISORDER (HCC): ICD-10-CM

## 2021-01-07 DIAGNOSIS — G40.909 SEIZURE DISORDER (HCC): Primary | ICD-10-CM

## 2021-01-07 LAB
ABSOLUTE EOS #: 0.15 K/UL (ref 0–0.44)
ABSOLUTE IMMATURE GRANULOCYTE: <0.03 K/UL (ref 0–0.3)
ABSOLUTE LYMPH #: 2.11 K/UL (ref 1.1–3.7)
ABSOLUTE MONO #: 0.65 K/UL (ref 0.1–1.2)
AMMONIA: 113 UMOL/L (ref 16–60)
BASOPHILS # BLD: 1 % (ref 0–2)
BASOPHILS ABSOLUTE: 0.04 K/UL (ref 0–0.2)
DIFFERENTIAL TYPE: NORMAL
EOSINOPHILS RELATIVE PERCENT: 3 % (ref 1–4)
HCT VFR BLD CALC: 48.6 % (ref 40.7–50.3)
HEMOGLOBIN: 16.1 G/DL (ref 13–17)
IMMATURE GRANULOCYTES: 0 %
LYMPHOCYTES # BLD: 35 % (ref 24–43)
MCH RBC QN AUTO: 31.8 PG (ref 25.2–33.5)
MCHC RBC AUTO-ENTMCNC: 33.1 G/DL (ref 28.4–34.8)
MCV RBC AUTO: 96 FL (ref 82.6–102.9)
MONOCYTES # BLD: 11 % (ref 3–12)
NRBC AUTOMATED: 0 PER 100 WBC
PDW BLD-RTO: 12.4 % (ref 11.8–14.4)
PHENYTOIN DATE LAST DOSE: ABNORMAL
PHENYTOIN DOSE AMOUNT: ABNORMAL
PHENYTOIN DOSE TIME: ABNORMAL
PHENYTOIN FREE: 0.3 UG/ML (ref 1–2)
PHENYTOIN LEVEL: 2.1 UG/ML (ref 10–20)
PLATELET # BLD: 173 K/UL (ref 138–453)
PLATELET ESTIMATE: NORMAL
PMV BLD AUTO: 11.4 FL (ref 8.1–13.5)
RBC # BLD: 5.06 M/UL (ref 4.21–5.77)
RBC # BLD: NORMAL 10*6/UL
SEG NEUTROPHILS: 50 % (ref 36–65)
SEGMENTED NEUTROPHILS ABSOLUTE COUNT: 3.14 K/UL (ref 1.5–8.1)
VALPROIC ACID LEVEL: 71 UG/ML (ref 50–125)
VALPROIC ACID, FREE: 3.6 UG/ML (ref 7–23)
VALPROIC DATE LAST DOSE: NORMAL
VALPROIC DOSE AMOUNT: NORMAL
VALPROIC TIME LAST DOSE: NORMAL
WBC # BLD: 6.1 K/UL (ref 3.5–11.3)
WBC # BLD: NORMAL 10*3/UL

## 2021-01-07 PROCEDURE — 80165 DIPROPYLACETIC ACID FREE: CPT

## 2021-01-07 PROCEDURE — 36415 COLL VENOUS BLD VENIPUNCTURE: CPT

## 2021-01-07 PROCEDURE — 80185 ASSAY OF PHENYTOIN TOTAL: CPT

## 2021-01-07 PROCEDURE — 4004F PT TOBACCO SCREEN RCVD TLK: CPT | Performed by: STUDENT IN AN ORGANIZED HEALTH CARE EDUCATION/TRAINING PROGRAM

## 2021-01-07 PROCEDURE — 85025 COMPLETE CBC W/AUTO DIFF WBC: CPT

## 2021-01-07 PROCEDURE — 99214 OFFICE O/P EST MOD 30 MIN: CPT | Performed by: STUDENT IN AN ORGANIZED HEALTH CARE EDUCATION/TRAINING PROGRAM

## 2021-01-07 PROCEDURE — G8484 FLU IMMUNIZE NO ADMIN: HCPCS | Performed by: STUDENT IN AN ORGANIZED HEALTH CARE EDUCATION/TRAINING PROGRAM

## 2021-01-07 PROCEDURE — 82140 ASSAY OF AMMONIA: CPT

## 2021-01-07 PROCEDURE — 80164 ASSAY DIPROPYLACETIC ACD TOT: CPT

## 2021-01-07 PROCEDURE — G8417 CALC BMI ABV UP PARAM F/U: HCPCS | Performed by: STUDENT IN AN ORGANIZED HEALTH CARE EDUCATION/TRAINING PROGRAM

## 2021-01-07 PROCEDURE — G8427 DOCREV CUR MEDS BY ELIG CLIN: HCPCS | Performed by: STUDENT IN AN ORGANIZED HEALTH CARE EDUCATION/TRAINING PROGRAM

## 2021-01-07 PROCEDURE — 80186 ASSAY OF PHENYTOIN FREE: CPT

## 2021-01-07 RX ORDER — DIVALPROEX SODIUM 500 MG/1
500 TABLET, DELAYED RELEASE ORAL 2 TIMES DAILY
Qty: 180 TABLET | Refills: 2 | Status: SHIPPED | OUTPATIENT
Start: 2021-01-07 | End: 2021-10-30 | Stop reason: SDUPTHER

## 2021-01-07 RX ORDER — PHENYTOIN SODIUM 300 MG/1
300 CAPSULE, EXTENDED RELEASE ORAL DAILY
Qty: 90 CAPSULE | Refills: 2 | Status: SHIPPED | OUTPATIENT
Start: 2021-01-07 | End: 2021-10-30 | Stop reason: SDUPTHER

## 2021-01-07 RX ORDER — DIVALPROEX SODIUM 250 MG/1
250 TABLET, DELAYED RELEASE ORAL 2 TIMES DAILY
Qty: 180 TABLET | Refills: 2 | Status: SHIPPED | OUTPATIENT
Start: 2021-01-07 | End: 2021-06-22

## 2021-01-07 RX ORDER — SIMVASTATIN 40 MG
TABLET ORAL
COMMUNITY
Start: 2020-12-30

## 2021-01-07 ASSESSMENT — ENCOUNTER SYMPTOMS
EYE PAIN: 0
DIARRHEA: 0
NAUSEA: 0
PHOTOPHOBIA: 0
CONSTIPATION: 0
VOMITING: 0
SHORTNESS OF BREATH: 0
COUGH: 0
ABDOMINAL PAIN: 0

## 2021-01-07 NOTE — PROGRESS NOTES
[address] [place] [address],  [address], [facility], [address] [facility], [address] [place] [address]  P: [phone]  F: [phone]    NEUROLOGY CLINIC NOTE     PATIENT NAME: Fab Lee  PATIENT MRN: [id number]  PRIMARY CARE PHYSICIAN: No primary care provider on file. Interval History: 1/7/2021   Patient was last seen in the clinic in September 2019. Patient never followed up in the clinic after that. Since last visit he said he had only one seizure at around 10 months ago, he was in care home at that time. Denies any recurrence of seizure activity since then. He continues to follow-up at [facility] for his psychiatric disorders. As per patient he is compliant with his medications,  During the last clinic visit, his Depakote level was low and hence dose of Depakote was increased to 750 mg twice a day. But he still continues to take Depakote 500 mg twice a day. He is on phenytoin  mg daily. Denies any side effects from these medications. Denies any other new neurologic concerns during this visit  Denies any other changes in his medications. Denies any other new allergies. Interval History: 9/27/2019     Patient was last seen in July, since last visit he had one seizure last week, that was secondary to substance abuse, he smoked weed and K2 that contributed to his seizure. He is compliant with his medication, taking phenytoin  mg daily and valproic acid 500 mg daily. Denies any side effects on the medication. His phenytoin levels where therapeutic during the last visit but valproic acid level was low, free level was 2 and total level was 35. As per patient and his wife he is taking valproic acid 500 mg daily and not missing any of his doses. Denies any other concerns since last visit.       Interval history 7/31/2019 Patient was last seen in May 2019, since last visit he denies any recurrence of seizure activity since last visit. Patient was not clear with his history. His phenytoin level on 7/22/2019 was  free 1.7 and total was 12.6. He was supposed to take phenytoin  mg once a day, as per patient he was taking phenytoin 300 mg twice a day instead of that and this was prescribed by his PCP a week before. No clear documentation of prescription of phenytoin 300 mg twice a day present in the epic chart. Called the patient's pharmacy to confirm the dosing it stated 300 mg daily. Patient denies any signs of toxicity at present. We will recheck the levels today. He said he is compliant with his medication and taking valproic acid  mg twice a day. Doubt from patient's description if he is taking his medications regularly and as prescribed. Notes from 5/24/2019  HPI:      Blessing Styles is a 39 y.o. left handed  male with PMH significant for Seizures, current smoker and depression was seen in the clinic for seizures    History obtained from Patient. He is a very poor historian, he was very sleepy in the office and was not able to provide much history. Seizures history:  Patient endorses history of seizures since age 9. He does not recollect how the seizures started, but described grand mal seizures. Patient is a very poor historian and was not able to tell more details about his seizures. He was not sure what medications he took in the past.  He does not remember the name of the neurologist he saw in the past, at present he is on Depakote 500 mg twice a day and phenytoin  mg daily but patient was not taking his medications as prescribed. He said he was taking phenytoin 200 mg daily instead of 300 mg.   His blood level was checked recently on 5/6/2019, phenytoin free level was less than 0.4 and  Total was 3.3 background, suggesting mild nonspecific encephalopathy due to metabolic,  toxic, pharmacological diffuse structural etiology. Routine EEG 1/19/2010 in care everywhere. Clinical Impression - This is an abnormal outpatient EEG.   Technically, the  presence of diffuse background slowing and generalized appearing  discharges.  The slowing is indicative of a mild encephalopathy of  nonspecific etiology.  The primary appearing generalized discharges may be  indicative of a lower threshold for generalized onset seizures, although  they can be seen asymptomatically in family members of patients with  generalized epilepsy.  Careful clinical correlation is recommended. PATIENT HISTORY:     Past Medical History:   Diagnosis Date    Seizures [facility])       Past surgical history  No past surgical history on file.      Social History     Socioeconomic History    Marital status:      Spouse name: Not on file    Number of children: Not on file    Years of education: Not on file    Highest education level: Not on file   Occupational History    Not on file   Social Needs    Financial resource strain: Not on file    Food insecurity     Worry: Not on file     Inability: Not on file    Transportation needs     Medical: Not on file     Non-medical: Not on file   Tobacco Use    Smoking status: Current Every Day Smoker     Packs/day: 2.00     Years: 23.00     Pack years: 46.00     Types: Cigarettes    Smokeless tobacco: Never Used   Substance and Sexual Activity    Alcohol use: No    Drug use: No    Sexual activity: Yes     Partners: Female   Lifestyle    Physical activity     Days per week: Not on file     Minutes per session: Not on file    Stress: Not on file   Relationships    Social connections     Talks on phone: Not on file     Gets together: Not on file     Attends Zoroastrian service: Not on file     Active member of club or organization: Not on file Attends meetings of clubs or organizations: Not on file     Relationship status: Not on file    Intimate partner violence     Fear of current or ex partner: Not on file     Emotionally abused: Not on file     Physically abused: Not on file     Forced sexual activity: Not on file   Other Topics Concern    Not on file   Social History Narrative    Not on file        Current Outpatient Medications   Medication Sig Dispense Refill    simvastatin (ZOCOR) 40 MG tablet       phenytoin (PHENYTEK) 300 MG ER capsule Take 1 capsule by mouth daily Unsure of dose 90 capsule 2    divalproex (DEPAKOTE) 500 MG DR tablet Take 1 tablet by mouth 2 times daily 180 tablet 2    divalproex (DEPAKOTE) 250 MG DR tablet Take 1 tablet by mouth 2 times daily 180 tablet 2    penicillin v potassium (VEETID) 500 MG tablet Take 1 tablet by mouth 4 times daily 40 tablet 0    acetaminophen (TYLENOL) 325 MG tablet Take 2 tablets by mouth every 6 hours as needed for Pain 30 tablet 0    furosemide (LASIX) 20 MG tablet Take 20 mg by mouth daily      dicyclomine (BENTYL) 20 MG tablet Take 1 tablet by mouth every 6 hours 15 tablet 0    ondansetron (ZOFRAN ODT) 4 MG disintegrating tablet Take 1 tablet by mouth every 8 hours as needed for Nausea 10 tablet 0    ibuprofen (IBU) 600 MG tablet Take 1 tablet by mouth every 6 hours as needed for Pain (Patient not taking: Reported on 1/7/2021) 30 tablet 0    ibuprofen (ADVIL;MOTRIN) 800 MG tablet Take 1 tablet by mouth every 8 hours as needed for Pain (Patient not taking: Reported on 7/31/2019) 30 tablet 0    ibuprofen (IBU) 600 MG tablet Take 1 tablet by mouth every 6 hours as needed for Pain (Patient not taking: Reported on 7/31/2019) 40 tablet 0     No current facility-administered medications for this visit.          Allergies  No Known Allergies     REVIEW OF SYSTEMS:     Review of Systems Constitutional: Positive for fatigue. Negative for appetite change, chills, fever and unexpected weight change. Eyes: Negative for photophobia, pain and visual disturbance. Respiratory: Negative for cough and shortness of breath. Cardiovascular: Negative for chest pain and palpitations. Gastrointestinal: Negative for abdominal pain, constipation, diarrhea, nausea and vomiting. Endocrine: Negative for polydipsia and polyuria. Genitourinary: Negative for difficulty urinating, dysuria and hematuria. Skin: Negative for pallor and rash. Neurological: Positive for seizures. Negative for dizziness, tremors, syncope, facial asymmetry, speech difficulty, weakness, light-headedness, numbness and headaches. Psychiatric/Behavioral: Positive for confusion, decreased concentration, dysphoric mood and sleep disturbance. Negative for behavioral problems and hallucinations. VITALS  /82 (Site: Right Upper Arm, Position: Sitting, Cuff Size: Medium Adult)   Pulse 67   Wt 215 lb 3.2 oz (97.6 kg)   BMI 30.88 kg/m²      PHYSICAL EXAMINATION:     Constitutional: Well developed, well nourished, not in distress  Head:  normocephalic, atraumatic.   Neck: supple, no carotid bruits,  Respiratory: Clear to auscultation bilaterally   Cardiovascular: normal rate, regular rhythm,   Abdomen: Soft, nontender, nondistended,   Extremities:  peripheral pulses palpable, no pedal edema   Psych: normal affect      NEUROLOGICAL EXAMINATION:     Mental status   Alert and oriented; impaired memory with no confusion, speech or language problems; no hallucinations or delusions     Cranial nerves   II - visual fields intact to confrontation                                                III, IV, VI  extra-ocular muscles full: no pupillary defect; no ANDRIY, no nystagmus, no ptosis   V - normal facial sensation VII - normal facial symmetry                                                             VIII - intact hearing                                                                             IX, X - symmetrical palate                                                                  XI - symmetrical shoulder shrug                                                       XII - midline tongue without atrophy or fasciculation     Motor function  Normal muscle bulk and tone  Muscle strength: normal power 5/5       Sensory function Intact to touch in bilateral upper and lower extremities. Cerebellar  no tremors or involuntary movements noted. Reflex function Intact 1+ DTR and symmetric. Negative Babinski     Gait                  Normal station and gait. Patient was able to perform toe, heel and tandem walking without much difficulty. PRIOR TESTS AND IMAGING: Following images and Labs were reviewed by the examiner       MRI brain with and without contrast: None      Routine EE2011 in care everywhere. Clinical interpretation:  This inpatient electroencephalogram, obtained  during wakefulness and drowsiness, is abnormal due to slowing of the  background, suggesting mild nonspecific encephalopathy due to metabolic,  toxic, pharmacological diffuse structural etiology. Routine EEG 2010 in care everywhere. Clinical Impression - This is an abnormal outpatient EEG.   Technically, the  presence of diffuse background slowing and generalized appearing  discharges.  The slowing is indicative of a mild encephalopathy of  nonspecific etiology.  The primary appearing generalized discharges may be  indicative of a lower threshold for generalized onset seizures, although  they can be seen asymptomatically in family members of patients with  generalized epilepsy.  Careful clinical correlation is recommended.     Results for Yin Mahogany (MRN [id number]) as of 2019 13:33 Ref. Range 7/22/2019 10:35 7/31/2019 16:35   Phenytoin Lvl Latest Ref Range: 10.0 - 20.0 ug/mL 12.6 12.7   Phenytoin, Free Latest Ref Range: 1.0 - 2.0 ug/mL 1.7 1.3   Valproic Acid Lvl Latest Ref Range: 50 - 125 ug/mL  35 (L)   Valproic Acid, Free Latest Ref Range: 7.0 - 23.0 ug/mL  2.0 (L)       ASSESSMENT / PLAN:     Artemio Wu is a 37 y.o. left handed  male with PMH significant for Seizures, current smoker and depression was seen in the clinic for seizures    · History of seizure disorders, unclear etiology. Seizures currently well controlled, last seizure was more than 10 years ago. · History of depression  · Current smoker  · Sleep disturbances  · Medication noncompliance      Plan:    Patient is very noncompliant with his medication. Currently taking phenytoin  mg daily and valproic acid  mg twice a day. His valproic acid level was low and hence his dose of Depakote was increased to 750 mg twice a day during last clinic visit, but he still continues to take valproic acid  mg twice a day. Instructed patient to increase the dose of valproic acid to 750 mg twice a day. We will check valproic acid level free and total, phenytoin level free and total along with CBC and CMP and ammonia levels. -MRI of the brain-none   he already has abnormal EEG in the past. Last seizure recently was secondary to substance abuse K2 and weed use. - Seizure Precautions:   I counseled the patient with regard to seizure precautions for injury prevention and reinforced their rationale. No driving for at least 6 months, no activity at dangerous heights, no operation of dangerous machinery, no baths, no swimming. Caution (preferably accompanied) with cooking or near fires.  The patient expressed understanding.     - Maintain seizure diary  a. seizures that occur, duration and description (or type)  b. any side effects from medications and medication dosage  c. mood or behavioral changes

## 2021-04-10 ENCOUNTER — APPOINTMENT (OUTPATIENT)
Dept: GENERAL RADIOLOGY | Age: 46
End: 2021-04-10
Payer: MEDICAID

## 2021-04-10 ENCOUNTER — HOSPITAL ENCOUNTER (EMERGENCY)
Age: 46
Discharge: HOME | End: 2021-04-10
Attending: EMERGENCY MEDICINE
Payer: MEDICAID

## 2021-04-10 VITALS
OXYGEN SATURATION: 97 % | HEART RATE: 90 BPM | RESPIRATION RATE: 18 BRPM | SYSTOLIC BLOOD PRESSURE: 125 MMHG | DIASTOLIC BLOOD PRESSURE: 83 MMHG | TEMPERATURE: 97.5 F

## 2021-04-10 DIAGNOSIS — S60.221A CONTUSION OF RIGHT HAND, INITIAL ENCOUNTER: Primary | ICD-10-CM

## 2021-04-10 PROCEDURE — 99281 EMR DPT VST MAYX REQ PHY/QHP: CPT

## 2021-04-10 PROCEDURE — 6370000000 HC RX 637 (ALT 250 FOR IP): Performed by: STUDENT IN AN ORGANIZED HEALTH CARE EDUCATION/TRAINING PROGRAM

## 2021-04-10 PROCEDURE — 73130 X-RAY EXAM OF HAND: CPT

## 2021-04-10 RX ORDER — IBUPROFEN 600 MG/1
600 TABLET ORAL EVERY 6 HOURS PRN
Qty: 15 TABLET | Refills: 0 | Status: SHIPPED | OUTPATIENT
Start: 2021-04-10 | End: 2022-05-18

## 2021-04-10 RX ORDER — IBUPROFEN 800 MG/1
800 TABLET ORAL ONCE
Status: COMPLETED | OUTPATIENT
Start: 2021-04-10 | End: 2021-04-10

## 2021-04-10 RX ORDER — ACETAMINOPHEN 500 MG
1000 TABLET ORAL EVERY 6 HOURS PRN
Qty: 30 TABLET | Refills: 0 | Status: SHIPPED | OUTPATIENT
Start: 2021-04-10 | End: 2022-05-18

## 2021-04-10 RX ADMIN — IBUPROFEN 800 MG: 800 TABLET, FILM COATED ORAL at 15:45

## 2021-04-10 ASSESSMENT — ENCOUNTER SYMPTOMS
SHORTNESS OF BREATH: 0
EYE DISCHARGE: 0
COLOR CHANGE: 0
EYE REDNESS: 0
ABDOMINAL PAIN: 0

## 2021-04-10 ASSESSMENT — PAIN SCALES - GENERAL: PAINLEVEL_OUTOF10: 10

## 2021-04-10 NOTE — ED NOTES
Wrist right splint applied with verbal instructions.   Pt verbalized understanding of.     Nirmala Courser, RN  04/10/21 9720

## 2021-04-10 NOTE — ED PROVIDER NOTES
[address] [facility]     Emergency Department     Faculty Note/ Attestation      Pt Name: Leigh Verma                                       MRN: [id number]  [place] 1975  Date of evaluation: 4/10/2021    Patients PCP:    No primary care provider on file. Attestation  I performed a history and physical examination of the patient and discussed management with the resident. I reviewed the residents note and agree with the documented findings and plan of care. Any areas of disagreement are noted on the chart. I was personally present for the key portions of any procedures. I have documented in the chart those procedures where I was not present during the key portions. I have reviewed the emergency nurses triage note. I agree with the chief complaint, past medical history, past surgical history, allergies, medications, social and family history as documented unless otherwise noted below. For Physician Assistant/ Nurse Practitioner cases/documentation I have personally evaluated this patient and have completed at least one if not all key elements of the E/M (history, physical exam, and MDM). Additional findings are as noted. Initial Screens:             Vitals:    Vitals:    04/10/21 1458   BP: 125/83   Pulse: 90   Resp: 18   SpO2: 97%       CHIEF COMPLAINT     No chief complaint on file. The patient is a 38 YO M that punched something and having thumb pain. Slightly limited ROM but no warmth or redness or fevers    DIAGNOSTIC RESULTS     RADIOLOGY:   No orders to display       LABS:  Labs Reviewed - No data to display    EMERGENCY DEPARTMENT COURSE:     -------------------------  BP: 125/83,  , Pulse: 90, Resp: 18  Physical Exam  Constitutional:       Appearance: He is well-developed. He is not diaphoretic. HENT:      Head: Normocephalic and atraumatic. Right Ear: External ear normal.      Left Ear: External ear normal.   Eyes:      General: No scleral icterus.         Right eye: No discharge. Left eye: No discharge. Neck:      Musculoskeletal: Normal range of motion. Trachea: No tracheal deviation. Pulmonary:      Effort: Pulmonary effort is normal. No respiratory distress. Breath sounds: No stridor. Musculoskeletal:      Comments: Musculoskelletal:  The patients Right upper extremity was evaluated and had pulse that was 2+ with normal cap refill of less than 2 seconds. This was equal compared to the other side. There was appropriate motor and sensory preception to this area. Range of motion was assesses and noted to be normal but limited slightly by pain. There was no signs of deformity, lacerations, abrasions or other wounds. Skin:     General: Skin is warm and dry. Neurological:      Mental Status: He is alert and oriented to person, place, and time. Coordination: Coordination normal.   Psychiatric:         Behavior: Behavior normal.           Comments    Will offer wrap for comfort and oral nonsteroidals and APAP for pain. Laurice Schlatter,, DO, RDMS.   Attending Emergency Physician          Laurice Schlatter, DO  04/10/21 0232

## 2021-04-11 NOTE — ED PROVIDER NOTES
[address] [facility]  ED  Emergency Department Encounter  Emergency Medicine Resident     Pt Name: Krish Jimenez  MRN: [id number]  [place] 1975  Date of evaluation: 4/10/21  PCP:  No primary care provider on file. CHIEF COMPLAINT       Chief Complaint   Patient presents with    Hand Pain     right       HISTORY OFPRESENT ILLNESS  (Location/Symptom, Timing/Onset, Context/Setting, Quality, Duration, Modifying Factors,Severity.)      Krish Jimenez is a 39 y.o. male who presents with right hand pain. Patient reportedly punched an object out of anger shortly prior to arrival.  Complaining of base of thumb pain. Decreased range of motion. Pain is moderate. Worse with movement. Worse with palpation. PAST MEDICAL / SURGICAL / SOCIAL / FAMILY HISTORY      has a past medical history of Seizures ([facility] [address] has no past surgical history on file.     Social History     Socioeconomic History    Marital status:      Spouse name: Not on file    Number of children: Not on file    Years of education: Not on file    Highest education level: Not on file   Occupational History    Not on file   Social Needs    Financial resource strain: Not on file    Food insecurity     Worry: Not on file     Inability: Not on file    Transportation needs     Medical: Not on file     Non-medical: Not on file   Tobacco Use    Smoking status: Current Every Day Smoker     Packs/day: 2.00     Years: 23.00     Pack years: 46.00     Types: Cigarettes    Smokeless tobacco: Never Used   Substance and Sexual Activity    Alcohol use: No    Drug use: No    Sexual activity: Yes     Partners: Female   Lifestyle    Physical activity     Days per week: Not on file     Minutes per session: Not on file    Stress: Not on file   Relationships    Social connections     Talks on phone: Not on file     Gets together: Not on file     Attends Gnosticist service: Not on file     Active member of club or organization: Not on file Attends meetings of clubs or organizations: Not on file     Relationship status: Not on file    Intimate partner violence     Fear of current or ex partner: Not on file     Emotionally abused: Not on file     Physically abused: Not on file     Forced sexual activity: Not on file   Other Topics Concern    Not on file   Social History Narrative    Not on file       No family history on file. Allergies:  Patient has no known allergies. Home Medications:  Prior to Admission medications    Medication Sig Start Date End Date Taking? Authorizing Provider   acetaminophen (APAP EXTRA STRENGTH) 500 MG tablet Take 2 tablets by mouth every 6 hours as needed for Pain 4/10/21  Yes Teddi Nissen, DO   ibuprofen (ADVIL;MOTRIN) 600 MG tablet Take 1 tablet by mouth every 6 hours as needed for Pain 4/10/21  Yes Teddi Nissen,    simvastatin (ZOCOR) 40 MG tablet  12/30/20   Historical Provider, MD   phenytoin (PHENYTEK) 300 MG ER capsule Take 1 capsule by mouth daily Unsure of dose 1/7/21 4/7/21  Saravanan Dill MD   divalproex (DEPAKOTE) 500 MG DR tablet Take 1 tablet by mouth 2 times daily 1/7/21 4/7/21  Saravanan Dill MD   divalproex (DEPAKOTE) 250 MG DR tablet Take 1 tablet by mouth 2 times daily 1/7/21 4/7/21  Saravanan Dill MD   penicillin v potassium (VEETID) 500 MG tablet Take 1 tablet by mouth 4 times daily 1/26/20   Sophia Cortes PA-C   furosemide (LASIX) 20 MG tablet Take 20 mg by mouth daily    Historical Provider, MD   dicyclomine (BENTYL) 20 MG tablet Take 1 tablet by mouth every 6 hours 4/30/18   Meri Kasper MD   ondansetron (ZOFRAN ODT) 4 MG disintegrating tablet Take 1 tablet by mouth every 8 hours as needed for Nausea 4/30/18   Meri Kasper MD       REVIEW OF SYSTEMS    (2-9 systems for level 4, 10 or more for level 5)      Review of Systems   Constitutional: Negative for chills and fever. Eyes: Negative for discharge and redness. Respiratory: Negative for shortness of breath. Cardiovascular: Negative for chest pain. Gastrointestinal: Negative for abdominal pain. Genitourinary: Negative for dysuria. Musculoskeletal: Negative for arthralgias. Skin: Negative for color change and rash. Allergic/Immunologic: Negative for environmental allergies. Neurological: Negative for headaches. Psychiatric/Behavioral: Negative for agitation and confusion. PHYSICAL EXAM   (up to 7 for level 4, 8 or more for level 5)     INITIAL VITALS:    temperature is 97.5 °F (36.4 °C). His blood pressure is 125/83 and his pulse is 90. His respiration is 18 and oxygen saturation is 97%. Physical Exam  Vitals signs and nursing note reviewed. Constitutional:       Appearance: He is well-developed. HENT:      Head: Normocephalic and atraumatic. Eyes:      General: No scleral icterus. Conjunctiva/sclera: Conjunctivae normal.      Pupils: Pupils are equal, round, and reactive to light. Cardiovascular:      Rate and Rhythm: Normal rate and regular rhythm. Heart sounds: Normal heart sounds. No murmur. No friction rub. No gallop. Pulmonary:      Effort: Pulmonary effort is normal. No respiratory distress. Breath sounds: Normal breath sounds. No wheezing or rales. Musculoskeletal:      Comments: No bruising or lacerations visualized to right hand, decreased opposition, able to give thumbs up, pulses intact distally, cap refill less than 2 seconds, no scaphoid tenderness. Skin:     General: Skin is warm and dry. Findings: No erythema or rash. Neurological:      Mental Status: He is alert and oriented to person, place, and time.    Psychiatric:         Behavior: Behavior normal.         DIFFERENTIAL  DIAGNOSIS     PLAN (LABS / IMAGING / EKG):  Orders Placed This Encounter   Procedures    XR HAND RIGHT (MIN 3 VIEWS)    [employer] Wrist Brace, Right       MEDICATIONS ORDERED:  Orders Placed This Encounter   Medications    ibuprofen (ADVIL;MOTRIN) tablet 800 mg    acetaminophen (APAP EXTRA STRENGTH) 500 MG tablet     Sig: Take 2 tablets by mouth every 6 hours as needed for Pain     Dispense:  30 tablet     Refill:  0    ibuprofen (ADVIL;MOTRIN) 600 MG tablet     Sig: Take 1 tablet by mouth every 6 hours as needed for Pain     Dispense:  15 tablet     Refill:  0       DDX: Fracture versus strain versus sprain    Initial MDM/Plan: 39 y.o. male who presents with thenar eminence pain. Will get x-ray. Dissipate preformed splint for comfort. Discharge. DIAGNOSTIC RESULTS / EMERGENCY DEPARTMENT COURSE / MDM     LABS:  Labs Reviewed - No data to display      RADIOLOGY:  Xr Hand Right (min 3 Views)    Result Date: 4/10/2021  EXAMINATION: THREE XRAY VIEWS OF THE RIGHT HAND 4/10/2021 3:09 pm COMPARISON: 06/20/2018 HISTORY: ORDERING SYSTEM PROVIDED HISTORY: punched object; Thenar pain TECHNOLOGIST PROVIDED HISTORY: punched object; Thenar pain Reason for Exam: right hand pain FINDINGS: Frontal, oblique, and lateral views of the hand are submitted for review. There is no evidence for acute fracture or dislocation. Bony mineralization is normal for age. No aggressive mass lesion or periostitis identified. Overlying soft tissues are unremarkable, without evidence for radiopaque foreign body. No acute osseous abnormality of the hand. EMERGENCY DEPARTMENT COURSE:  X-ray negative. Preformed splint applied for comfort. Patient discharged. · Based on the low acuity of concerning symptoms and improvement of symptoms, patient will be discharged with follow up and prescription information listed in the Disposition section. · Patient states they will follow-up with primary care physician and/or return to the emergency department should they experience a change or worsening of symptoms. · Patient will be discharged with resources: summary of visit, instructions, follow-up information, prescriptions if necessary and clinics available.   · Patient/ family

## 2021-06-20 ENCOUNTER — HOSPITAL ENCOUNTER (EMERGENCY)
Age: 46
Discharge: HOME | End: 2021-06-20
Attending: EMERGENCY MEDICINE
Payer: MEDICAID

## 2021-06-20 ENCOUNTER — APPOINTMENT (OUTPATIENT)
Dept: GENERAL RADIOLOGY | Age: 46
End: 2021-06-20
Payer: MEDICAID

## 2021-06-20 VITALS
SYSTOLIC BLOOD PRESSURE: 150 MMHG | HEART RATE: 99 BPM | RESPIRATION RATE: 16 BRPM | DIASTOLIC BLOOD PRESSURE: 92 MMHG | TEMPERATURE: 98.1 F | OXYGEN SATURATION: 99 %

## 2021-06-20 DIAGNOSIS — S62.327A CLOSED DISPLACED FRACTURE OF SHAFT OF FIFTH METACARPAL BONE OF LEFT HAND, INITIAL ENCOUNTER: Primary | ICD-10-CM

## 2021-06-20 PROCEDURE — 73130 X-RAY EXAM OF HAND: CPT

## 2021-06-20 PROCEDURE — 29125 APPL SHORT ARM SPLINT STATIC: CPT

## 2021-06-20 PROCEDURE — 6370000000 HC RX 637 (ALT 250 FOR IP): Performed by: STUDENT IN AN ORGANIZED HEALTH CARE EDUCATION/TRAINING PROGRAM

## 2021-06-20 PROCEDURE — 99283 EMERGENCY DEPT VISIT LOW MDM: CPT

## 2021-06-20 RX ORDER — IBUPROFEN 400 MG/1
600 TABLET ORAL ONCE
Status: COMPLETED | OUTPATIENT
Start: 2021-06-20 | End: 2021-06-20

## 2021-06-20 RX ADMIN — IBUPROFEN 600 MG: 400 TABLET, FILM COATED ORAL at 14:41

## 2021-06-20 ASSESSMENT — PAIN DESCRIPTION - FREQUENCY: FREQUENCY: CONTINUOUS

## 2021-06-20 ASSESSMENT — ENCOUNTER SYMPTOMS
VOMITING: 0
COUGH: 0
ABDOMINAL DISTENTION: 0
DIARRHEA: 0
SHORTNESS OF BREATH: 0

## 2021-06-20 ASSESSMENT — PAIN SCALES - GENERAL
PAINLEVEL_OUTOF10: 10

## 2021-06-20 ASSESSMENT — PAIN DESCRIPTION - LOCATION: LOCATION: HAND

## 2021-06-20 ASSESSMENT — PAIN DESCRIPTION - DESCRIPTORS: DESCRIPTORS: ACHING

## 2021-06-20 ASSESSMENT — PAIN DESCRIPTION - ORIENTATION: ORIENTATION: LEFT

## 2021-06-20 NOTE — ED PROVIDER NOTES
[address] [facility]     Emergency Department     Faculty Attestation    I performed a history and physical examination of the patient and discussed management with the resident. I reviewed the residents note and agree with the documented findings and plan of care. Any areas of disagreement are noted on the chart. I was personally present for the key portions of any procedures. I have documented in the chart those procedures where I was not present during the key portions. I have reviewed the emergency nurses triage note. I agree with the chief complaint, past medical history, past surgical history, allergies, medications, social, and family history as documented unless otherwise noted below.        This is a 70-year-old male who presents to the emergency department for evaluation of left hand pain after punching a door earlier today  Patient denies a history of previous hand fracture  Patient reports pain primarily in the area of the third through fifth metacarpals  Pain is worse with movement  No open wound  No distal paresthesias  Patient denies other injury  Denies recent illness or fever  No chest pain, dizziness, pulmonary concerns, or other issues today    On examination he appears in no acute distress  Flat affect  Heart is regular in rate and rhythm  Lungs are clear to auscultation  Patient has pain and swelling over the distal fifth metacarpal  Pain without swelling to distal 3rd and 4th metcarpals  Decreased range of motion and muscle strength of the fingers secondary to pain and effort  Intact peripheral pulses, perfusion, and sensation    X-rays show concern for 5th metatarsal fracture  Will plan for an ulnar gutter splint and outpatient ortho follow up      Guy Bailon DO  Attending Emergency Physician        Bahman Vo DO  06/20/21 4286

## 2021-06-20 NOTE — ED PROVIDER NOTES
[facility]  ED  Emergency Department Encounter  EmergencyMedicine Resident     Pt Name:Lucio Ladd  MRN: [id number]  [place] 1975  Date of evaluation: 6/20/21  PCP:  No primary care provider on file. CHIEF COMPLAINT       Chief Complaint   Patient presents with    Hand Pain     punched a door after arguing with his Mother over his loud music. HISTORY OF PRESENT ILLNESS  (Location/Symptom, Timing/Onset, Context/Setting, Quality, Duration, Modifying Factors, Severity.)      Rubin Ladd is a 39 y.o. male who presents with left hand pain and swelling when he punched the door with his left hand after argument with his mother. He states that he was playing music and her mother was turning it down, when he punched a door with his left hand in frustration. There is swelling at left fifth metacarpophalangeal joint. Patient is unable to fully make a fist due to pain. Has intact distal neurovascular bundle. No chest pain, shortness of breath, vomiting, abdominal pain, headache or vision changes. PAST MEDICAL / SURGICAL / SOCIAL / FAMILY HISTORY      has a past medical history of Seizures ([facility] [address] has no past surgical history on file.     Social History     Socioeconomic History    Marital status:      Spouse name: Not on file    Number of children: Not on file    Years of education: Not on file    Highest education level: Not on file   Occupational History    Not on file   Tobacco Use    Smoking status: Current Every Day Smoker     Packs/day: 2.00     Years: 23.00     Pack years: 46.00     Types: Cigarettes    Smokeless tobacco: Never Used   Vaping Use    Vaping Use: Never used   Substance and Sexual Activity    Alcohol use: No    Drug use: No    Sexual activity: Yes     Partners: Female   Other Topics Concern    Not on file   Social History Narrative    Not on file     Social Determinants of Health     Financial Resource Strain:     Difficulty of Paying Living Expenses:    Food Insecurity:     Worried About Running Out of Food in the Last Year:     [address] in the Last Year:    Transportation Needs:     Lack of Transportation (Medical):  Lack of Transportation (Non-Medical):    Physical Activity:     Days of Exercise per Week:     Minutes of Exercise per Session:    Stress:     Feeling of Stress :    Social Connections:     Frequency of Communication with Friends and Family:     Frequency of Social Gatherings with Friends and Family:     Attends [employer] Services:     Active Member of Clubs or Organizations:     Attends Club or Organization Meetings:     Marital Status:    Intimate Partner Violence:     Fear of Current or Ex-Partner:     Emotionally Abused:     Physically Abused:     Sexually Abused:        No family history on file. Allergies:  Patient has no known allergies. Home Medications:  Prior to Admission medications    Medication Sig Start Date End Date Taking?  Authorizing Provider   acetaminophen (APAP EXTRA STRENGTH) 500 MG tablet Take 2 tablets by mouth every 6 hours as needed for Pain 4/10/21   Nolvia Meter, DO   ibuprofen (ADVIL;MOTRIN) 600 MG tablet Take 1 tablet by mouth every 6 hours as needed for Pain 4/10/21   Nolvia Meter, DO   simvastatin (ZOCOR) 40 MG tablet  12/30/20   Historical Provider, MD   phenytoin (PHENYTEK) 300 MG ER capsule Take 1 capsule by mouth daily Unsure of dose 1/7/21 4/7/21  Ellyn Dacosta MD   divalproex (DEPAKOTE) 500 MG DR tablet Take 1 tablet by mouth 2 times daily 1/7/21 4/7/21  Ellyn Daocsta MD   divalproex (DEPAKOTE) 250 MG DR tablet Take 1 tablet by mouth 2 times daily 1/7/21 4/7/21  Ellyn Dacosta MD   penicillin v potassium (VEETID) 500 MG tablet Take 1 tablet by mouth 4 times daily 1/26/20   Anai Cortes PA-C   furosemide (LASIX) 20 MG tablet Take 20 mg by mouth daily    Historical Provider, MD   dicyclomine (BENTYL) 20 MG tablet Take 1 tablet by mouth every 6 hours 4/30/18 Marko Call MD   ondansetron (ZOFRAN ODT) 4 MG disintegrating tablet Take 1 tablet by mouth every 8 hours as needed for Nausea 4/30/18   Marko Call MD       REVIEW OF SYSTEMS    (2-9 systems for level 4, 10 or more for level 5)      Review of Systems   Constitutional: Negative for fever. HENT: Negative for congestion. Respiratory: Negative for cough and shortness of breath. Cardiovascular: Negative for chest pain and palpitations. Gastrointestinal: Negative for abdominal distention, diarrhea and vomiting. Genitourinary: Negative for dysuria. Musculoskeletal: Positive for arthralgias (Left hand metacarpophalangeal joints) and joint swelling (Left fifth metacarpophalangeal joint). Skin: Negative for wound. Neurological: Negative for dizziness, weakness and headaches. Hematological: Does not bruise/bleed easily. Psychiatric/Behavioral: Negative for agitation and confusion. PHYSICAL EXAM   (up to 7 for level 4, 8 or more for level 5)      INITIAL VITALS:   BP (!) 150/92   Pulse 99   Temp 98.1 °F (36.7 °C) (Oral)   Resp 16   SpO2 99%     Alert and oriented x3  Tender left and metacarpophalangeal joints, most pronounced in left fifth metacarpophalangeal joint. Mild swelling at left fifth metacarpophalangeal joint. No cyanosis or erythema. Chest: Equal breath sounds bilateral  Heart: Regular rate and rhythm  Abdomen: Soft, nontender nondistended abdomen. No organomegaly. No pedal edema    DIFFERENTIAL  DIAGNOSIS     PLAN (LABS / IMAGING / EKG):  Orders Placed This Encounter   Procedures    XR HAND LEFT (MIN 3 VIEWS)       MEDICATIONS ORDERED:  Orders Placed This Encounter   Medications    ibuprofen (ADVIL;MOTRIN) tablet 600 mg           DIAGNOSTIC RESULTS / EMERGENCY DEPARTMENT COURSE / MDM     LABS:  No results found for this visit on 06/20/21.         RADIOLOGY:  XR HAND LEFT (MIN 3 VIEWS)    Result Date: 6/20/2021  EXAMINATION: THREE XRAY VIEWS OF THE LEFT HAND 6/20/2021 1:54 pm COMPARISON: None. HISTORY: ORDERING SYSTEM PROVIDED HISTORY: left hand pain after punching the door TECHNOLOGIST PROVIDED HISTORY: left hand pain after punching the door Reason for Exam: swelling to 5th mc Acuity: Acute Type of Exam: Initial FINDINGS: There is mildly displaced slightly comminuted fracture of the distal shaft of the 5th metacarpal with mild dorsal angulation of the fracture apex. There is 2 mm ossicle seen along the ulnar styloid, likely reflecting sequela of an old trauma. No evidence of dislocation is seen. Mildly displaced slightly angulated fracture of the distal shaft of the 5th metacarpal.       EKG  None    All EKG's are interpreted by the Emergency Department Physician who either signs or Co-signs this chart in the absence of a cardiologist.    EMERGENCY DEPARTMENT COURSE/IMPRESSION:  ED Course as of Jun 20 1555   Sun Jun 20, 2021   1524 X-ray showed fifth metacarpal mildly displaced fracture. Ulnar gutter applied. Plan to discharge home with outpatient orthopedic follow-up. [GG]      ED Course User Index  [GG] Evette Mena MD     24-year-old male presented with left hand pain and swelling after punching the door with his left hand. Patient was hemodynamically stable and saturating well on room air. There was swelling at the level of left fifth metacarpophalangeal joint and tender metacarpophalangeal joints of left hand mainly fifth metacarpophalangeal joint. No cyanosis or erythema noted. X-rays of left hand showed fracture of shaft of left fifth metacarpal.  Ulnar gutter splint applied. Intact distal neurovascular bundle after application of splint. Patient was discharged home with instructions to follow outpatient with hand surgeon. Patient was advised to come back to emergency department in case of worsening pain, swelling, cyanosis. Patient verbalized understanding.     PROCEDURES:  Left hand ulnar gutter splint application    CONSULTS:  None    CRITICAL CARE:  None    FINAL IMPRESSION      1. Closed displaced fracture of shaft of fifth metacarpal bone of left hand, initial encounter          DISPOSITION / PLAN     DISPOSITION Decision To Discharge 06/20/2021 03:31:52 PM      PATIENT REFERRED TO:  Corinne Caster, MD  [address] [place] [address].   Stephanie Langley [facility]  [phone]    Schedule an appointment as soon as possible for a visit         DISCHARGE MEDICATIONS:  Discharge Medication List as of 6/20/2021  3:36 PM          Ventura Melissa MD  Emergency Medicine Resident    (Please note that portions of this note were completed with a voice recognition program.  Efforts were made to edit the dictations but occasionally words aremis-transcribed.)       Ventura Melissa MD  Resident  06/20/21 2121

## 2021-06-22 ENCOUNTER — OFFICE VISIT (OUTPATIENT)
Dept: BURN CARE | Age: 46
End: 2021-06-22
Payer: MEDICAID

## 2021-06-22 VITALS
BODY MASS INDEX: 28.69 KG/M2 | DIASTOLIC BLOOD PRESSURE: 75 MMHG | HEART RATE: 76 BPM | SYSTOLIC BLOOD PRESSURE: 124 MMHG | WEIGHT: 200.4 LBS | HEIGHT: 70 IN

## 2021-06-22 DIAGNOSIS — S62.92XD OPEN FRACTURE OF LEFT HAND WITH ROUTINE HEALING, SUBSEQUENT ENCOUNTER: Primary | ICD-10-CM

## 2021-06-22 PROCEDURE — G8417 CALC BMI ABV UP PARAM F/U: HCPCS | Performed by: PLASTIC SURGERY

## 2021-06-22 PROCEDURE — 4004F PT TOBACCO SCREEN RCVD TLK: CPT | Performed by: PLASTIC SURGERY

## 2021-06-22 PROCEDURE — 99202 OFFICE O/P NEW SF 15 MIN: CPT | Performed by: PLASTIC SURGERY

## 2021-06-22 PROCEDURE — 99203 OFFICE O/P NEW LOW 30 MIN: CPT | Performed by: PLASTIC SURGERY

## 2021-06-22 PROCEDURE — G8427 DOCREV CUR MEDS BY ELIG CLIN: HCPCS | Performed by: PLASTIC SURGERY

## 2021-06-22 NOTE — PROGRESS NOTES
[facility] Clinic Note    S: Pt is a 39 y.o. male being seen for left hand pain. Patient presents as a follow-up from emergency department on 6/20/2021 for: Left displaced fracture of the fifth metacarpal bone. He was placed in a splint. He has no new acute complaints at this time. Denies any recent injury since he punched a wall on 6/20/2021. O:   Vitals:    06/22/21 0807   BP: 124/75   Pulse: 76     Gen: NAD, cooperative    RUE: Ulnar splint intact. Patient able to actively flex/extend all digits. Stiffness noted at digits 4 and 5. Sensation intact to extremity. Extremity warm and well-perfused with BCR. X-ray shows minim al angulation of fracture. Will proceed with splinting, no need for surgery. ER splint flat, not proper position. Will need new splint. A/P: 39 y.o. male presents with left fifth metacarpal fracture    - New intrinsic plus splint applied to left upper extremity  Maintain splint on. Do not remove  Follow-up in 4 weeks to assess range of motion. Tylenol/ibuprofen for pain control    Electronically signed by Ahsan Yang DO 9:00 AM 6/22/2021    Attending Physician Statement  I have discussed the case, including pertinent history and exam findings with the resident. I have seen and examined the patient and the key elements of all parts of the encounter have been performed by me. I agree with the assessment, plan and orders as documented by the resident.   Erik Conner MD

## 2021-06-22 NOTE — LETTER
[address] [facility] [address] [facility] [address]  Phone: [phone]  Fax: [phone]    Marylou Champagne MD        June 22, 2021     Patient: Tyler Escamilla   YOB: 1975   Date of Visit: 6/22/2021       To Whom It May Concern: It is my medical opinion that Tyler Escamilla may return to work on 6- with the following restrictions: no use of left upper extremity. .    If you have any questions or concerns, please don't hesitate to call.     Sincerely,        Marylou Champagne MD

## 2021-06-29 ENCOUNTER — HOSPITAL ENCOUNTER (EMERGENCY)
Age: 46
Discharge: HOME | End: 2021-06-29
Attending: EMERGENCY MEDICINE
Payer: MEDICAID

## 2021-06-29 VITALS
OXYGEN SATURATION: 98 % | SYSTOLIC BLOOD PRESSURE: 156 MMHG | HEART RATE: 82 BPM | DIASTOLIC BLOOD PRESSURE: 88 MMHG | RESPIRATION RATE: 17 BRPM | TEMPERATURE: 98.1 F

## 2021-06-29 DIAGNOSIS — Z47.89 CAST DISCOMFORT: Primary | ICD-10-CM

## 2021-06-29 PROCEDURE — 99282 EMERGENCY DEPT VISIT SF MDM: CPT

## 2021-06-29 ASSESSMENT — ENCOUNTER SYMPTOMS
NAUSEA: 0
SINUS PRESSURE: 0
CONSTIPATION: 0
SORE THROAT: 0
ABDOMINAL PAIN: 0
SHORTNESS OF BREATH: 0
EYE PAIN: 0
SINUS PAIN: 0
ABDOMINAL DISTENTION: 0
COUGH: 0
EYE ITCHING: 0
DIARRHEA: 0

## 2021-06-29 NOTE — ED TRIAGE NOTES
Pt wants soft cast removed to left hand states he is left handed unable to feed self or complete ADLs with cast placed, Cast was placed approx 1.5 weeks ago after getting into verbal altercation with mother then getting mad at her and punching his bedroom wall after she wouldn't leave when he asked her to. Pt is in process of moving and wants cast removed.  Denies other issues

## 2021-06-29 NOTE — ED PROVIDER NOTES
[facility] [address]  ED  Emergency Department Encounter  EmergencyMedicine Resident     Pt Name:Lucio Ladd  MRN: [id number]  [place] 1975  Date of evaluation: 6/29/21  PCP:  No primary care provider on file. This patient was evaluated in the Emergency Department for symptoms described in the history of present illness. The patient was evaluated in the context of the global COVID-19 pandemic, which necessitated consideration that the patient might be at risk for infection with the SARS-CoV-2 virus that causes COVID-19. Institutional protocols and algorithms that pertain to the evaluation of patients at risk for COVID-19 are in a state of rapid change based on information released by regulatory bodies including the CDC and federal and state organizations. These policies and algorithms were followed during the patient's care in the ED. CHIEF COMPLAINT       Chief Complaint   Patient presents with    Other     pt wants soft cast removed was placed approx 1.5 weeks ago       HISTORY OF PRESENT ILLNESS  (Location/Symptom, Timing/Onset, Context/Setting, Quality, Duration, Modifying Factors, Severity.)      Mónica Roach is a 39 y.o. male who presents with for cast removal.  Patient states that he punched a door 9 days ago resulting in a fracture of the left fifth metacarpal.  He presented to the emergency department where a splint was placed and he followed up with plastic surgery 2 days later who replaced the splint and instructed him to follow-up in 4 weeks. Patient states that he is in the middle of moving it in the cast is a nuisance, making it difficult for him to pack and move boxes. Denies any numbness or tingling or increased pain in the left hand    PAST MEDICAL / SURGICAL / SOCIAL / FAMILY HISTORY      has a past medical history of Seizures ([address] has no past surgical history on file.       Social History     Socioeconomic History    Marital status:      Spouse name: Not on file    Number of children: Not on file    Years of education: Not on file    Highest education level: Not on file   Occupational History    Not on file   Tobacco Use    Smoking status: Current Every Day Smoker     Packs/day: 2.00     Years: 23.00     Pack years: 46.00     Types: Cigarettes    Smokeless tobacco: Never Used   Vaping Use    Vaping Use: Never used   Substance and Sexual Activity    Alcohol use: No    Drug use: No    Sexual activity: Yes     Partners: Female   Other Topics Concern    Not on file   Social History Narrative    Not on file     Social Determinants of Health     Financial Resource Strain:     Difficulty of Paying Living Expenses:    Food Insecurity:     Worried About Running Out of Food in the Last Year:     Ran Out of Food in the Last Year:    Transportation Needs:     Lack of Transportation (Medical):  Lack of Transportation (Non-Medical):    Physical Activity:     Days of Exercise per Week:     Minutes of Exercise per Session:    Stress:     Feeling of Stress :    Social Connections:     Frequency of Communication with Friends and Family:     Frequency of Social Gatherings with Friends and Family:     Attends Sikhism Services:     Active Member of Clubs or Organizations:     Attends Club or Organization Meetings:     Marital Status:    Intimate Partner Violence:     Fear of Current or Ex-Partner:     Emotionally Abused:     Physically Abused:     Sexually Abused:        No family history on file. Allergies:  Patient has no known allergies. Home Medications:  Prior to Admission medications    Medication Sig Start Date End Date Taking?  Authorizing Provider   acetaminophen (APAP EXTRA STRENGTH) 500 MG tablet Take 2 tablets by mouth every 6 hours as needed for Pain  Patient not taking: Reported on 6/22/2021 4/10/21   Hector Miguel DO   ibuprofen (ADVIL;MOTRIN) 600 MG tablet Take 1 tablet by mouth every 6 hours as needed for Pain  Patient not taking: Reported on 6/22/2021 4/10/21   Lelo Moore DO   simvastatin (ZOCOR) 40 MG tablet  12/30/20   Historical Provider, MD   phenytoin (PHENYTEK) 300 MG ER capsule Take 1 capsule by mouth daily Unsure of dose 1/7/21 6/22/21  Jaymie Pierce MD   divalproex (DEPAKOTE) 500 MG DR tablet Take 1 tablet by mouth 2 times daily 1/7/21 6/22/21  Jaymie Pierce MD   penicillin v potassium (VEETID) 500 MG tablet Take 1 tablet by mouth 4 times daily  Patient not taking: Reported on 6/22/2021 1/26/20   Fuentes Cortes PA-C   furosemide (LASIX) 20 MG tablet Take 20 mg by mouth daily  Patient not taking: Reported on 6/22/2021    Historical Provider, MD   dicyclomine (BENTYL) 20 MG tablet Take 1 tablet by mouth every 6 hours  Patient not taking: Reported on 6/22/2021 4/30/18   Katharina Boyer MD   ondansetron (ZOFRAN ODT) 4 MG disintegrating tablet Take 1 tablet by mouth every 8 hours as needed for Nausea  Patient not taking: Reported on 6/22/2021 4/30/18   Katharina Boyer MD       REVIEW OF SYSTEMS    (2-9 systems for level 4, 10 or more for level 5)      Review of Systems   Constitutional: Negative for activity change, chills and fever. HENT: Negative for congestion, sinus pressure, sinus pain and sore throat. Eyes: Negative for pain and itching. Respiratory: Negative for cough and shortness of breath. Cardiovascular: Negative for chest pain. Gastrointestinal: Negative for abdominal distention, abdominal pain, constipation, diarrhea and nausea. Endocrine: Negative for polyuria. Genitourinary: Negative for dysuria and frequency. Musculoskeletal: Negative for arthralgias. Skin: Negative for rash. Neurological: Negative for light-headedness and headaches. PHYSICAL EXAM   (up to 7 for level 4, 8 or more for level 5)      INITIAL VITALS:   BP (!) 156/88   Pulse 82   Temp 98.1 °F (36.7 °C) (Oral)   Resp 17   SpO2 98%     Physical Exam  Vitals reviewed.    Constitutional:       General: He is not in acute distress. HENT:      Head: Normocephalic and atraumatic. Ears:      Comments: Hearing grossly normal     Nose: Nose normal.      Mouth/Throat:      Mouth: Mucous membranes are moist.      Pharynx: Oropharynx is clear. Eyes:      General: No scleral icterus. Conjunctiva/sclera: Conjunctivae normal.      Pupils: Pupils are equal, round, and reactive to light. Cardiovascular:      Rate and Rhythm: Normal rate and regular rhythm. Pulses: Normal pulses. Pulmonary:      Effort: Pulmonary effort is normal. No respiratory distress. Breath sounds: Normal breath sounds. Abdominal:      General: There is no distension. Palpations: Abdomen is soft. Tenderness: There is no abdominal tenderness. There is no guarding. Musculoskeletal:      Cervical back: No muscular tenderness. Right lower leg: No edema. Left lower leg: No edema. Comments: Left ulnar gutter splint in place  Distally neurovascularly intact   Skin:     General: Skin is warm and dry. Capillary Refill: Capillary refill takes less than 2 seconds. Neurological:      General: No focal deficit present. Mental Status: He is alert and oriented to person, place, and time. Mental status is at baseline. DIFFERENTIAL  DIAGNOSIS     PLAN (LABS / IMAGING / EKG):  No orders of the defined types were placed in this encounter. MEDICATIONS ORDERED:  No orders of the defined types were placed in this encounter. DIAGNOSTIC RESULTS / EMERGENCY DEPARTMENT COURSE / MDM   LAB RESULTS:  No results found for this visit on 06/29/21. IMPRESSION: Sharee Perez is a 39 y.o. man presenting for cast removal. He is asymptomatic but requesting the cast to be removed as his pain is resolved and he is having difficulty with daily activities and moving with the cast in place.  Discussed with him that due to the fracture and specialist recommendation that the ED cannot remove the cast as early removal could result in poor

## 2021-06-29 NOTE — ED PROVIDER NOTES
[facility]     Emergency Department     Faculty Note/ Attestation      Pt Name: Mark Garrett                                       MRN: [id number]  [place] 1975  Date of evaluation: 6/29/2021    Patients PCP:    No primary care provider on file. Attestation  I performed a history and physical examination of the patient and discussed management with the resident. I reviewed the residents note and agree with the documented findings and plan of care. Any areas of disagreement are noted on the chart. I was personally present for the key portions of any procedures. I have documented in the chart those procedures where I was not present during the key portions. I have reviewed the emergency nurses triage note. I agree with the chief complaint, past medical history, past surgical history, allergies, medications, social and family history as documented unless otherwise noted below. For Physician Assistant/ Nurse Practitioner cases/documentation I have personally evaluated this patient and have completed at least one if not all key elements of the E/M (history, physical exam, and MDM). Additional findings are as noted. Initial Screens:             Vitals:    Vitals:    06/29/21 1114   BP: (!) 156/88   Pulse: 82   Resp: 17   Temp: 98.1 °F (36.7 °C)   TempSrc: Oral   SpO2: 98%       CHIEF COMPLAINT       Chief Complaint   Patient presents with    Other     pt wants soft cast removed was placed approx 1.5 weeks ago       The pt arrives wants his splint off today. The pt has had it in place and want it taken off now even though he is not suppose to have it removed until late July. The patient notes he is moving and needs to have it removed. The patient does not want to wait until he is seen in specialty clinic in July to have the splint removed. He notes no pain and states he is moving the fingers and notes it does not hurt.           EMERGENCY DEPARTMENT COURSE: -------------------------  BP: (!) 156/88, Temp: 98.1 °F (36.7 °C), Pulse: 82, Resp: 17  Physical Exam  Constitutional:       Appearance: He is well-developed. He is not diaphoretic. HENT:      Head: Normocephalic and atraumatic. Right Ear: External ear normal.      Left Ear: External ear normal.   Eyes:      General: No scleral icterus. Right eye: No discharge. Left eye: No discharge. Neck:      Trachea: No tracheal deviation. Pulmonary:      Effort: Pulmonary effort is normal. No respiratory distress. Breath sounds: No stridor. Musculoskeletal:         General: Tenderness present. Normal range of motion. Cervical back: Normal range of motion. Comments: The pt has splint in place    Skin:     General: Skin is warm and dry. Neurological:      Mental Status: He is alert and oriented to person, place, and time. Coordination: Coordination normal.   Psychiatric:         Behavior: Behavior normal.           Comments  Dr. Geetha Peña and myself discussed that removal of the splint would result in non union or permenant disability to the hand. He notes he is left handed and would take it off himself if we do not. We do not advise this and have recommended against this as it can cause permanent disability if the healing does not happen well. Kaitlyn Miller DO,, DO, RDMS.   Attending Emergency Physician            Kaitlyn Miller DO  06/29/21 1122

## 2021-07-22 NOTE — ED NOTES
Pt given instructions for follow-up and discharge. Pt given education on prescriptions. Pt verbalizes understanding. Pt is A&O x4, PWD, eupneic, and ambulatory with steady, even gait upon discharge.       Leeann Goel RN  01/26/20 4837 Performed By: Harley

## 2021-10-30 ENCOUNTER — APPOINTMENT (OUTPATIENT)
Dept: GENERAL RADIOLOGY | Age: 46
End: 2021-10-30
Payer: MEDICAID

## 2021-10-30 ENCOUNTER — HOSPITAL ENCOUNTER (EMERGENCY)
Age: 46
Discharge: HOME | End: 2021-10-31
Attending: EMERGENCY MEDICINE
Payer: MEDICAID

## 2021-10-30 VITALS
TEMPERATURE: 98.1 F | RESPIRATION RATE: 20 BRPM | WEIGHT: 200 LBS | HEIGHT: 70 IN | OXYGEN SATURATION: 95 % | DIASTOLIC BLOOD PRESSURE: 79 MMHG | BODY MASS INDEX: 28.63 KG/M2 | HEART RATE: 70 BPM | SYSTOLIC BLOOD PRESSURE: 141 MMHG

## 2021-10-30 DIAGNOSIS — R56.9 SEIZURE (HCC): Primary | ICD-10-CM

## 2021-10-30 DIAGNOSIS — M25.552 LEFT HIP PAIN: ICD-10-CM

## 2021-10-30 LAB
PHENYTOIN DATE LAST DOSE: ABNORMAL
PHENYTOIN DOSE AMOUNT: ABNORMAL
PHENYTOIN DOSE TIME: ABNORMAL
PHENYTOIN LEVEL: <0.8 UG/ML (ref 10–20)
VALPROIC ACID % FREE: ABNORMAL % (ref 5–18.4)
VALPROIC ACID LEVEL: <3 UG/ML (ref 50–125)
VALPROIC ACID, FREE: <0.4 UG/ML (ref 7–23)
VALPROIC DATE LAST DOSE: ABNORMAL
VALPROIC DOSE AMOUNT: ABNORMAL
VALPROIC TIME LAST DOSE: ABNORMAL

## 2021-10-30 PROCEDURE — 80165 DIPROPYLACETIC ACID FREE: CPT

## 2021-10-30 PROCEDURE — 6370000000 HC RX 637 (ALT 250 FOR IP): Performed by: HEALTH CARE PROVIDER

## 2021-10-30 PROCEDURE — 80164 ASSAY DIPROPYLACETIC ACD TOT: CPT

## 2021-10-30 PROCEDURE — 99284 EMERGENCY DEPT VISIT MOD MDM: CPT

## 2021-10-30 PROCEDURE — 96365 THER/PROPH/DIAG IV INF INIT: CPT

## 2021-10-30 PROCEDURE — 2580000003 HC RX 258: Performed by: HEALTH CARE PROVIDER

## 2021-10-30 PROCEDURE — 73502 X-RAY EXAM HIP UNI 2-3 VIEWS: CPT

## 2021-10-30 PROCEDURE — 93005 ELECTROCARDIOGRAM TRACING: CPT | Performed by: HEALTH CARE PROVIDER

## 2021-10-30 PROCEDURE — 6360000002 HC RX W HCPCS: Performed by: HEALTH CARE PROVIDER

## 2021-10-30 PROCEDURE — 80185 ASSAY OF PHENYTOIN TOTAL: CPT

## 2021-10-30 RX ORDER — DIVALPROEX SODIUM 500 MG/1
500 TABLET, DELAYED RELEASE ORAL 2 TIMES DAILY
Qty: 60 TABLET | Refills: 0 | Status: SHIPPED | OUTPATIENT
Start: 2021-10-30 | End: 2022-04-28 | Stop reason: SDUPTHER

## 2021-10-30 RX ORDER — DIVALPROEX SODIUM 500 MG/1
1000 TABLET, EXTENDED RELEASE ORAL ONCE
Status: COMPLETED | OUTPATIENT
Start: 2021-10-30 | End: 2021-10-30

## 2021-10-30 RX ORDER — PHENYTOIN SODIUM 300 MG/1
300 CAPSULE, EXTENDED RELEASE ORAL DAILY
Qty: 30 CAPSULE | Refills: 2 | Status: SHIPPED | OUTPATIENT
Start: 2021-10-30 | End: 2022-04-28 | Stop reason: SDUPTHER

## 2021-10-30 RX ADMIN — DEXTROSE MONOHYDRATE 1360 MG PE: 50 INJECTION, SOLUTION INTRAVENOUS at 23:23

## 2021-10-30 RX ADMIN — DIVALPROEX SODIUM 1000 MG: 500 TABLET, EXTENDED RELEASE ORAL at 23:07

## 2021-10-30 ASSESSMENT — ENCOUNTER SYMPTOMS
BACK PAIN: 0
NAUSEA: 0
SHORTNESS OF BREATH: 0
VOMITING: 0
ABDOMINAL PAIN: 0

## 2021-10-31 NOTE — ED NOTES
Patient discharged and now requesting a ride home with [facility]. Ride arranged. Confirmation number is [id number]. Segundo Weinberg.  [address], [address]  10/31/21 1958

## 2021-10-31 NOTE — ED NOTES
Cot side rails padded for seizure precautions. Pt talking on the phone with gf.       Danya Bailey, SHANNANN  73/87/08 7579

## 2021-10-31 NOTE — ED NOTES
Bed: 19  Expected date:   Expected time:   Means of arrival:   Comments:  M Alfredo Greenberg RN  10/30/21 5980

## 2021-10-31 NOTE — ED PROVIDER NOTES
[facility]     Emergency Department     Faculty Note/ Attestation      Pt Name: José Miguel Motta                                       MRN: [id number]  [place] 1975  Date of evaluation: 10/30/2021    Patients PCP:    No primary care provider on file. Attestation  I performed a history and physical examination of the patient and discussed management with the resident. I reviewed the residents note and agree with the documented findings and plan of care. Any areas of disagreement are noted on the chart. I was personally present for the key portions of any procedures. I have documented in the chart those procedures where I was not present during the key portions. I have reviewed the emergency nurses triage note. I agree with the chief complaint, past medical history, past surgical history, allergies, medications, social and family history as documented unless otherwise noted below. For Physician Assistant/ Nurse Practitioner cases/documentation I have personally evaluated this patient and have completed at least one if not all key elements of the E/M (history, physical exam, and MDM). Additional findings are as noted. Initial Screens:             Vitals: There were no vitals filed for this visit.     CHIEF COMPLAINT       Chief Complaint   Patient presents with    Seizures             DIAGNOSTIC RESULTS             RADIOLOGY:   No orders to display         LABS:  Labs Reviewed - No data to display      EMERGENCY DEPARTMENT COURSE:     -------------------------   ,  ,  ,        Comments    On depakote and dilantin, sz at bus stop  Witnessed, then post-ictal  No recollection of events  BIBEMS  Last sz 1 month ago, states he's taking his meds  Hasn't seen neuro since    Sleepy but otherwise mentating normally    Will send levels, observe briefly, unless has any other issues or does not fully return to baseline, will d/c with close neuro f/u    10:26 PM EDT  On further interview the patient states he has not taken his antiepileptics in a month or more as he has not had them.   We have him an oral load and prescriptions, discussed with social work and case management for neuro follow-up in prescription payment, and discharge him as long as he remained stable in the emergency department    (Please note that portions of this note were completed with a voice recognition program.  Efforts were made to edit the dictations but occasionally words are mis-transcribed.)      Imani Hay MD,, MD  Attending Emergency Physician         Imani Hay MD  10/30/21 2203       Imani Hay MD  10/30/21 7386

## 2021-10-31 NOTE — ED NOTES
Pt had a seizure while waiting for taxi. EMS stated pat was post-ictal for 20 minutes. Pt has abrasions on left eyebrow, forehead, and bridge of nose. Pt unsure what happened. Pt is alert and oriented times 2. Pt has not taken medication for a couple of days.          Hal Ayala, PATRICIA  07/00/24 0237

## 2021-10-31 NOTE — ED NOTES
SW met with patient due to report that he did not have his seizure medication. Patient states he has the Dilantin and was taking it. He ran out of the Depakote, however, so he has not been taking that. Patient has an appointment with Neurologist, Dr. eBlle Larry, on 11/4/21 and will get his scripts then. Patient has [facility] Medicaid in place to cover the cost of his meds. SW discussed a [facility] Medicaid ride at discharge and patient does not wish to wait for the ride as it may take up to 3 hours. Patient states he will get himself home. Gladys Aguilar.  Britni Valente, [place]  10/30/21 4309

## 2021-10-31 NOTE — ED PROVIDER NOTES
[facility] ED  Emergency Department Encounter  Emergency Medicine Resident     Pt Name: Kyrie Dalal  MRN: [id number]  [place] 1975  Date of evaluation: 10/30/21  PCP:  No primary care provider on file. CHIEF COMPLAINT       Chief Complaint   Patient presents with    Seizures       HISTORY OFPRESENT ILLNESS  (Location/Symptom, Timing/Onset, Context/Setting, Quality, Duration, Modifying Factors,Severity.)      Kyrie Dalal is a 55 y.o. male who presents with witnessed seizure. Occurred approximately 1 hour prior to arrival.  Patient [place] public bus station, when he experienced a generalized tonic, clonic seizure, reportedly lasting approximately 15 minutes. Patient was postictal for about 30 to 45 minutes thereafter. On arrival, patient is awake, alert and oriented. States he does not remember anything about the event. Patient states he takes Depakote and Dilantin. Not had his medications in approximately 1 month since he ran out. Last seizure was approximately 1 month ago. Did not seek medical care. Prior thereto, most recent seizure was 6 months. Patient has no primary care provider or neurologist.  Denies any alcohol use, excessive caffeine use, sleep deprivation, life stressors, or illness. PAST MEDICAL / SURGICAL / SOCIAL / FAMILY HISTORY      has a past medical history of Seizures ([facility] [address]     Denies surgical history    Social History     Socioeconomic History    Marital status:      Spouse name: Not on file    Number of children: Not on file    Years of education: Not on file    Highest education level: Not on file   Occupational History    Not on file   Tobacco Use    Smoking status: Current Every Day Smoker     Packs/day: 2.00     Years: 23.00     Pack years: 46.00     Types: Cigarettes    Smokeless tobacco: Never Used   Vaping Use    Vaping Use: Never used   Substance and Sexual Activity    Alcohol use: No    Drug use: No    Sexual activity: Yes Partners: Female   Other Topics Concern    Not on file   Social History Narrative    Not on file     Social Determinants of Health     Financial Resource Strain:     Difficulty of Paying Living Expenses:    Food Insecurity:     Worried About Running Out of Food in the Last Year:     [address] in the Last Year:    Transportation Needs:     Lack of Transportation (Medical):  Lack of Transportation (Non-Medical):    Physical Activity:     Days of Exercise per Week:     Minutes of Exercise per Session:    Stress:     Feeling of Stress :    Social Connections:     Frequency of Communication with Friends and Family:     Frequency of Social Gatherings with Friends and Family:     Attends [employer] Services:     Active Member of Clubs or Organizations:     Attends Club or Organization Meetings:     Marital Status:    Intimate Partner Violence:     Fear of Current or Ex-Partner:     Emotionally Abused:     Physically Abused:     Sexually Abused:      Family history noncontributory    Allergies:  Patient has no known allergies. Home Medications:  Prior to Admission medications    Medication Sig Start Date End Date Taking?  Authorizing Provider   divalproex (DEPAKOTE) 500 MG DR tablet Take 1 tablet by mouth 2 times daily 10/30/21 1/28/22 Yes Leigha Sibley MD   phenytoin (PHENYTEK) 300 MG ER capsule Take 1 capsule by mouth daily Unsure of dose 10/30/21 1/28/22 Yes Leigha Sibley MD   acetaminophen (APAP EXTRA STRENGTH) 500 MG tablet Take 2 tablets by mouth every 6 hours as needed for Pain  Patient not taking: Reported on 6/22/2021 4/10/21   Maya Garcia DO   ibuprofen (ADVIL;MOTRIN) 600 MG tablet Take 1 tablet by mouth every 6 hours as needed for Pain  Patient not taking: Reported on 6/22/2021 4/10/21   Maya Garcia DO   simvastatin (ZOCOR) 40 MG tablet  12/30/20   Historical Provider, MD   penicillin v potassium (VEETID) 500 MG tablet Take 1 tablet by mouth 4 times daily  Patient not taking: Reported on 6/22/2021 1/26/20   Quinton Cortes PA-C   furosemide (LASIX) 20 MG tablet Take 20 mg by mouth daily  Patient not taking: Reported on 6/22/2021    Historical Provider, MD   dicyclomine (BENTYL) 20 MG tablet Take 1 tablet by mouth every 6 hours  Patient not taking: Reported on 6/22/2021 4/30/18   Jacob Moreau MD   ondansetron (ZOFRAN ODT) 4 MG disintegrating tablet Take 1 tablet by mouth every 8 hours as needed for Nausea  Patient not taking: Reported on 6/22/2021 4/30/18   Jacob Moreau MD       REVIEW OFSYSTEMS    (2-9 systems for level 4, 10 or more for level 5)      Review of Systems   Constitutional: Negative for chills, fatigue and fever. HENT: Negative for tinnitus. Eyes: Negative for visual disturbance. Respiratory: Negative for shortness of breath. Cardiovascular: Negative for chest pain. Gastrointestinal: Negative for abdominal pain, nausea and vomiting. Genitourinary: Negative for difficulty urinating and dysuria. Musculoskeletal: Negative for back pain, neck pain and neck stiffness. Allergic/Immunologic: Negative for immunocompromised state. Neurological: Positive for seizures. Hematological: Does not bruise/bleed easily. Psychiatric/Behavioral: Negative for sleep disturbance. PHYSICAL EXAM   (up to 7 for level 4, 8 or more forlevel 5)      INITIAL VITALS:   ED Triage Vitals   BP Temp Temp Source Pulse Resp SpO2 Height Weight   10/30/21 2201 10/30/21 2205 10/30/21 2205 10/30/21 2201 10/30/21 2201 10/30/21 2201 -- --   (!) 152/77 98.1 °F (36.7 °C) Oral 80 17 94 %         Physical Exam  Vitals reviewed. Constitutional:       Appearance: Normal appearance. HENT:      Head: Normocephalic and atraumatic. Right Ear: External ear normal.      Left Ear: External ear normal.      Nose: Nose normal.      Mouth/Throat:      Mouth: Mucous membranes are moist.      Pharynx: Oropharynx is clear.    Eyes:      Extraocular Movements: Extraocular movements intact. Conjunctiva/sclera: Conjunctivae normal.      Pupils: Pupils are equal, round, and reactive to light. Cardiovascular:      Rate and Rhythm: Normal rate and regular rhythm. Pulses: Normal pulses. Heart sounds: Normal heart sounds. Pulmonary:      Effort: Pulmonary effort is normal.      Breath sounds: Normal breath sounds. Abdominal:      General: There is no distension. Palpations: Abdomen is soft. Tenderness: There is no abdominal tenderness. There is no guarding. Musculoskeletal:         General: Tenderness present. No swelling. Normal range of motion. Cervical back: Normal range of motion and neck supple. No rigidity or tenderness. Comments: Pain with flexion of left hip. Mild tenderness palpation of greater trochanter. Skin:     General: Skin is warm and dry. Capillary Refill: Capillary refill takes less than 2 seconds. Neurological:      Mental Status: He is alert and oriented to person, place, and time. Mental status is at baseline. Cranial Nerves: No cranial nerve deficit. Sensory: No sensory deficit.    Psychiatric:         Mood and Affect: Mood normal.         Behavior: Behavior normal.         DIFFERENTIAL  DIAGNOSIS     PLAN (LABS / IMAGING / EKG):  Orders Placed This Encounter   Procedures    XR HIP LEFT (2-3 VIEWS)    PHENYTOIN LEVEL, TOTAL    Valproic acid level, total and free    Inpatient consult to Social Work    EKG 12 Lead       MEDICATIONS ORDERED:  Orders Placed This Encounter   Medications    divalproex (DEPAKOTE ER) extended release tablet 1,000 mg    fosphenytoin (CEREBYX) 1,360 mg PE in dextrose 5 % 100 mL IVPB (loading dose)    divalproex (DEPAKOTE) 500 MG DR tablet     Sig: Take 1 tablet by mouth 2 times daily     Dispense:  60 tablet     Refill:  0    phenytoin (PHENYTEK) 300 MG ER capsule     Sig: Take 1 capsule by mouth daily Unsure of dose     Dispense:  30 capsule     Refill:  2         Initial MDM/Plan: 55 y.o. male who presents with seizure secondary to nonadherence to medication due to being out. Will obtain baseline levels and give patient loading doses of oral Depakote and IV fosphenytoin. Will consult social work to think patient with ongoing care. Will obtain x-ray of the left hips due to pain and concern for injury after the fall. DIAGNOSTIC RESULTS / EMERGENCYDEPARTMENT COURSE / MDM     LABS:  Labs Reviewed   PHENYTOIN LEVEL, TOTAL - Abnormal; Notable for the following components:       Result Value    Phenytoin Lvl <0.8 (*)     All other components within normal limits   VALPROIC ACID LEVEL, TOTAL AND FREE - Abnormal; Notable for the following components:    Valproic Acid Lvl <3 (*)     Valproic Acid, Free <0.4 (*)     All other components within normal limits         RADIOLOGY:  XR HIP LEFT (2-3 VIEWS)    Result Date: 10/30/2021  EXAMINATION: TWO XRAY VIEWS OF THE LEFT HIP 10/30/2021 10:54 pm COMPARISON: None. HISTORY: ORDERING SYSTEM PROVIDED HISTORY: Left hip pain s/p fall TECHNOLOGIST PROVIDED HISTORY: Left hip pain s/p fall Reason for Exam: fall, seizures  Left hip pain FINDINGS: No bone, joint or soft tissue abnormality of the left hip. No evidence of an acute fracture or dislocation. There are no degenerative changes. No evidence of an acute injury. EKG    EKG Interpretation    Interpreted by me    Rhythm: normal sinus   Rate: normal  Axis: normal  Ectopy: none  Conduction: normal  ST Segments: no acute change  T Waves: no acute change  Q Waves: none    Clinical Impression: no acute changes and normal EKG    All EKG's are interpreted by the Emergency Department Physicianwho either signs or Co-signs this chart in the absence of a cardiologist.    EMERGENCY DEPARTMENT COURSE:  ED Course as of Oct 31 0101   Sun Oct 31, 2021   0030 Loading doses of fosphenytoin and Depakote given. X-ray negative for any acute injury. Social work to evaluate patient.   He has a follow-up appointment with

## 2021-10-31 NOTE — ED PROVIDER NOTES
[facility]   Emergency Department  Emergency Medicine Attending Sign-out     Care of Elvie Deleon was assumed from previous attending Dr. Giovani Mock and is being seen for Seizures  . The patient's initial evaluation and plan have been discussed with the prior provider who initially evaluated the patient. Attestation  I was available and discussed any additional care issues that arose and coordinated the management plans with the resident(s) caring for the patient during my duty period. Any areas of disagreement with resident's documentation of care or procedures are noted on the chart. I was personally present for the key portions of any/all procedures, during my duty period. I have documented in the chart those procedures where I was not present during the key portions. BRIEF PATIENT SUMMARY/MDM COURSE PER INITIAL PROVIDER:   RECENT VITALS:     Temp: 98.1 °F (36.7 °C),  Pulse: 70, Resp: 20, BP: (!) 141/79, SpO2: 95 %    This patient is a 55 y.o. Male with seizure disorder. Normally takes Depakote and Dilantin. Has not been taking them. Subtherapeutic.   Loading and then plan for discharge    DIAGNOSTICS/MEDICATIONS:     MEDICATIONS GIVEN:  ED Medication Orders (From admission, onward)    Start Ordered     Status Ordering Provider    10/30/21 2245 10/30/21 2237  divalproex (DEPAKOTE ER) extended release tablet 1,000 mg  ONCE      Last MAR action: Given - by Izaiah Sarmiento on 10/30/21 at 2307 NILTON VARGAS    10/30/21 2245 10/30/21 2237  fosphenytoin (CEREBYX) 1,360 mg PE in dextrose 5 % 100 mL IVPB (loading dose)  ONCE      Acknowledged NILTON VARGAS          LABS    Labs Reviewed   PHENYTOIN LEVEL, TOTAL - Abnormal; Notable for the following components:       Result Value    Phenytoin Lvl <0.8 (*)     All other components within normal limits   VALPROIC ACID LEVEL, TOTAL AND FREE - Abnormal; Notable for the following components:    Valproic Acid Lvl <3 (*)     All other components within normal limits       RADIOLOGY  XR HIP LEFT (2-3 VIEWS)    Result Date: 10/30/2021  EXAMINATION: TWO XRAY VIEWS OF THE LEFT HIP 10/30/2021 10:54 pm COMPARISON: None. HISTORY: ORDERING SYSTEM PROVIDED HISTORY: Left hip pain s/p fall TECHNOLOGIST PROVIDED HISTORY: Left hip pain s/p fall Reason for Exam: fall, seizures  Left hip pain FINDINGS: No bone, joint or soft tissue abnormality of the left hip. No evidence of an acute fracture or dislocation. There are no degenerative changes. No evidence of an acute injury. OUTSTANDING TASKS / ADDITIONAL COMMENTS   1.  Discharge    Osmin Marina MD  Emergency Medicine Attending  [address] Génesis Kidd MD  10/30/21 5032

## 2021-11-01 LAB
EKG ATRIAL RATE: 75 BPM
EKG P AXIS: 52 DEGREES
EKG P-R INTERVAL: 124 MS
EKG Q-T INTERVAL: 380 MS
EKG QRS DURATION: 92 MS
EKG QTC CALCULATION (BAZETT): 424 MS
EKG R AXIS: 83 DEGREES
EKG T AXIS: 34 DEGREES
EKG VENTRICULAR RATE: 75 BPM

## 2021-11-01 PROCEDURE — 93010 ELECTROCARDIOGRAM REPORT: CPT | Performed by: INTERNAL MEDICINE

## 2022-03-24 ENCOUNTER — HOSPITAL ENCOUNTER (OUTPATIENT)
Age: 47
Discharge: HOME | End: 2022-03-24
Payer: MEDICAID

## 2022-03-24 LAB
ABSOLUTE EOS #: 0.19 K/UL (ref 0–0.44)
ABSOLUTE IMMATURE GRANULOCYTE: <0.03 K/UL (ref 0–0.3)
ABSOLUTE LYMPH #: 1.95 K/UL (ref 1.1–3.7)
ABSOLUTE MONO #: 0.71 K/UL (ref 0.1–1.2)
ALBUMIN SERPL-MCNC: 4.6 G/DL (ref 3.5–5.2)
ALBUMIN/GLOBULIN RATIO: 1.8 (ref 1–2.5)
ALP BLD-CCNC: 107 U/L (ref 40–129)
ALT SERPL-CCNC: 32 U/L (ref 5–41)
ANION GAP SERPL CALCULATED.3IONS-SCNC: 14 MMOL/L (ref 9–17)
AST SERPL-CCNC: 23 U/L
BASOPHILS # BLD: 1 % (ref 0–2)
BASOPHILS ABSOLUTE: 0.03 K/UL (ref 0–0.2)
BILIRUB SERPL-MCNC: 0.16 MG/DL (ref 0.3–1.2)
BUN BLDV-MCNC: 13 MG/DL (ref 6–20)
CALCIUM SERPL-MCNC: 9.2 MG/DL (ref 8.6–10.4)
CHLORIDE BLD-SCNC: 104 MMOL/L (ref 98–107)
CHOLESTEROL/HDL RATIO: 3.5
CHOLESTEROL: 157 MG/DL
CO2: 22 MMOL/L (ref 20–31)
CREAT SERPL-MCNC: 0.68 MG/DL (ref 0.7–1.2)
CREATININE URINE: 91.8 MG/DL (ref 39–259)
EOSINOPHILS RELATIVE PERCENT: 3 % (ref 1–4)
ESTIMATED AVERAGE GLUCOSE: 105 MG/DL
GFR AFRICAN AMERICAN: >60 ML/MIN
GFR NON-AFRICAN AMERICAN: >60 ML/MIN
GFR SERPL CREATININE-BSD FRML MDRD: ABNORMAL ML/MIN/{1.73_M2}
GLUCOSE BLD-MCNC: 120 MG/DL (ref 70–99)
HBA1C MFR BLD: 5.3 % (ref 4–6)
HCT VFR BLD CALC: 44.9 % (ref 40.7–50.3)
HDLC SERPL-MCNC: 45 MG/DL
HEMOGLOBIN: 15.7 G/DL (ref 13–17)
IMMATURE GRANULOCYTES: 0 %
LDL CHOLESTEROL: 93 MG/DL (ref 0–130)
LYMPHOCYTES # BLD: 32 % (ref 24–43)
MCH RBC QN AUTO: 33.8 PG (ref 25.2–33.5)
MCHC RBC AUTO-ENTMCNC: 35 G/DL (ref 28.4–34.8)
MCV RBC AUTO: 96.6 FL (ref 82.6–102.9)
MICROALBUMIN/CREAT 24H UR: <12 MG/L
MICROALBUMIN/CREAT UR-RTO: NORMAL MCG/MG CREAT
MONOCYTES # BLD: 12 % (ref 3–12)
NRBC AUTOMATED: 0 PER 100 WBC
PDW BLD-RTO: 12.6 % (ref 11.8–14.4)
PHENYTOIN LEVEL: 5.6 UG/ML (ref 10–20)
PLATELET # BLD: 200 K/UL (ref 138–453)
PMV BLD AUTO: 10.8 FL (ref 8.1–13.5)
POTASSIUM SERPL-SCNC: 4.4 MMOL/L (ref 3.7–5.3)
RBC # BLD: 4.65 M/UL (ref 4.21–5.77)
SEG NEUTROPHILS: 52 % (ref 36–65)
SEGMENTED NEUTROPHILS ABSOLUTE COUNT: 3.22 K/UL (ref 1.5–8.1)
SODIUM BLD-SCNC: 140 MMOL/L (ref 135–144)
TOTAL PROTEIN: 7.1 G/DL (ref 6.4–8.3)
TRIGL SERPL-MCNC: 94 MG/DL
TSH SERPL DL<=0.05 MIU/L-ACNC: 3.8 MIU/L (ref 0.3–5)
VALPROIC ACID % FREE: 8.2 % (ref 5–18.4)
VALPROIC ACID LEVEL: 62 UG/ML (ref 50–125)
VALPROIC ACID, FREE: 5.1 UG/ML (ref 7–23)
VITAMIN B-12: 940 PG/ML (ref 232–1245)
WBC # BLD: 6.1 K/UL (ref 3.5–11.3)

## 2022-03-24 PROCEDURE — 80164 ASSAY DIPROPYLACETIC ACD TOT: CPT

## 2022-03-24 PROCEDURE — 80165 DIPROPYLACETIC ACID FREE: CPT

## 2022-03-24 PROCEDURE — 82043 UR ALBUMIN QUANTITATIVE: CPT

## 2022-03-24 PROCEDURE — 83036 HEMOGLOBIN GLYCOSYLATED A1C: CPT

## 2022-03-24 PROCEDURE — 80061 LIPID PANEL: CPT

## 2022-03-24 PROCEDURE — 82570 ASSAY OF URINE CREATININE: CPT

## 2022-03-24 PROCEDURE — 82607 VITAMIN B-12: CPT

## 2022-03-24 PROCEDURE — 84443 ASSAY THYROID STIM HORMONE: CPT

## 2022-03-24 PROCEDURE — 80185 ASSAY OF PHENYTOIN TOTAL: CPT

## 2022-03-24 PROCEDURE — 85025 COMPLETE CBC W/AUTO DIFF WBC: CPT

## 2022-03-24 PROCEDURE — 82652 VIT D 1 25-DIHYDROXY: CPT

## 2022-03-24 PROCEDURE — 80053 COMPREHEN METABOLIC PANEL: CPT

## 2022-03-24 PROCEDURE — 36415 COLL VENOUS BLD VENIPUNCTURE: CPT

## 2022-03-25 LAB — VITAMIN D 1,25-DIHYDROXY: 33.9 PG/ML (ref 19.9–79.3)

## 2022-04-28 ENCOUNTER — OFFICE VISIT (OUTPATIENT)
Dept: NEUROLOGY | Age: 47
End: 2022-04-28
Payer: MEDICAID

## 2022-04-28 VITALS
OXYGEN SATURATION: 98 % | BODY MASS INDEX: 28.63 KG/M2 | SYSTOLIC BLOOD PRESSURE: 113 MMHG | DIASTOLIC BLOOD PRESSURE: 70 MMHG | WEIGHT: 200 LBS | HEIGHT: 70 IN | HEART RATE: 80 BPM

## 2022-04-28 DIAGNOSIS — G40.909 SEIZURE DISORDER (HCC): Primary | ICD-10-CM

## 2022-04-28 PROCEDURE — G8427 DOCREV CUR MEDS BY ELIG CLIN: HCPCS | Performed by: STUDENT IN AN ORGANIZED HEALTH CARE EDUCATION/TRAINING PROGRAM

## 2022-04-28 PROCEDURE — 4004F PT TOBACCO SCREEN RCVD TLK: CPT | Performed by: STUDENT IN AN ORGANIZED HEALTH CARE EDUCATION/TRAINING PROGRAM

## 2022-04-28 PROCEDURE — G8417 CALC BMI ABV UP PARAM F/U: HCPCS | Performed by: STUDENT IN AN ORGANIZED HEALTH CARE EDUCATION/TRAINING PROGRAM

## 2022-04-28 PROCEDURE — 99214 OFFICE O/P EST MOD 30 MIN: CPT | Performed by: STUDENT IN AN ORGANIZED HEALTH CARE EDUCATION/TRAINING PROGRAM

## 2022-04-28 RX ORDER — PHENYTOIN SODIUM 300 MG/1
300 CAPSULE, EXTENDED RELEASE ORAL DAILY
Qty: 30 CAPSULE | Refills: 2 | Status: SHIPPED | OUTPATIENT
Start: 2022-04-28 | End: 2022-07-27

## 2022-04-28 RX ORDER — DIVALPROEX SODIUM 250 MG/1
750 TABLET, DELAYED RELEASE ORAL 2 TIMES DAILY
Qty: 180 TABLET | Refills: 2 | Status: SHIPPED | OUTPATIENT
Start: 2022-04-28 | End: 2022-09-20

## 2022-04-28 ASSESSMENT — ENCOUNTER SYMPTOMS
CONSTIPATION: 0
PHOTOPHOBIA: 0
COUGH: 0
EYE PAIN: 0
DIARRHEA: 0
NAUSEA: 0
VOMITING: 0
ABDOMINAL PAIN: 0
SHORTNESS OF BREATH: 0

## 2022-04-28 NOTE — PROGRESS NOTES
[address] [place] [address],  [address], [facility], [address] [facility], [address] [place] [address]  P: [phone]  F: [phone]    NEUROLOGY CLINIC NOTE     PATIENT NAME: Sharee Perez  PATIENT MRN: [id number]  PRIMARY CARE PHYSICIAN: No primary care provider on file. Interval History: 4/28/2022  He reports being out of meds since yesterday morning. Reports 3 weeks ago he had while body shaking, for an known time. He cannot report the last seizure before that. He reports he wasn't feeling well at the time and was under significant stress. He says he was amnestic and his aunt told him he had a seizure. He reports this was unwitnessed. He states he is here today only for medication refills. Per Dr. Daria Grimes last note, he was supposed ot be taking 750 mg BID of VPA however he has been taking 500 mg BID. Interval History: 1/7/2021   Patient was last seen in the clinic in September 2019. Patient never followed up in the clinic after that. Since last visit he said he had only one seizure at around 10 months ago, he was in senior care at that time. Denies any recurrence of seizure activity since then. He continues to follow-up at [facility] for his psychiatric disorders. As per patient he is compliant with his medications,  During the last clinic visit, his Depakote level was low and hence dose of Depakote was increased to 750 mg twice a day. But he still continues to take Depakote 500 mg twice a day. He is on phenytoin  mg daily. Denies any side effects from these medications. Denies any other new neurologic concerns during this visit  Denies any other changes in his medications. Denies any other new allergies. Interval History: 9/27/2019     Patient was last seen in July, since last visit he had one seizure last week, that was secondary to substance abuse, he smoked weed and K2 that contributed to his seizure.     He is compliant with his medication, taking phenytoin  mg daily and valproic acid 500 mg daily. Denies any side effects on the medication. His phenytoin levels where therapeutic during the last visit but valproic acid level was low, free level was 2 and total level was 35. As per patient and his wife he is taking valproic acid 500 mg daily and not missing any of his doses. Denies any other concerns since last visit. Interval history 7/31/2019    Patient was last seen in May 2019, since last visit he denies any recurrence of seizure activity since last visit. Patient was not clear with his history. His phenytoin level on 7/22/2019 was  free 1.7 and total was 12.6. He was supposed to take phenytoin  mg once a day, as per patient he was taking phenytoin 300 mg twice a day instead of that and this was prescribed by his PCP a week before. No clear documentation of prescription of phenytoin 300 mg twice a day present in the epic chart. Called the patient's pharmacy to confirm the dosing it stated 300 mg daily. Patient denies any signs of toxicity at present. We will recheck the levels today. He said he is compliant with his medication and taking valproic acid  mg twice a day. Doubt from patient's description if he is taking his medications regularly and as prescribed. Notes from 5/24/2019    HPI:      Patricia Carrillo is a 55 y.o. left handed  male with PMH significant for Seizures, current smoker and depression was seen in the clinic for seizures    History obtained from Patient. He is a very poor historian, he was very sleepy in the office and was not able to provide much history. Seizures history:  Patient endorses history of seizures since age 9. He does not recollect how the seizures started, but described grand mal seizures. Patient is a very poor historian and was not able to tell more details about his seizures.   He was not sure what medications he took in the past.  He does not remember the name of the neurologist he saw in the past, at everywhere  IMPRESSION:  No abnormality identified. Routine EE2011 in care everywhere. Clinical interpretation:  This inpatient electroencephalogram, obtained  during wakefulness and drowsiness, is abnormal due to slowing of the  background, suggesting mild nonspecific encephalopathy due to metabolic,  toxic, pharmacological diffuse structural etiology. Routine EEG 2010 in care everywhere. Clinical Impression - This is an abnormal outpatient EEG.   Technically, the  presence of diffuse background slowing and generalized appearing  discharges.  The slowing is indicative of a mild encephalopathy of  nonspecific etiology.  The primary appearing generalized discharges may be  indicative of a lower threshold for generalized onset seizures, although  they can be seen asymptomatically in family members of patients with  generalized epilepsy.  Careful clinical correlation is recommended. PATIENT HISTORY:     Past Medical History:   Diagnosis Date    Seizures [facility])       Past surgical history  History reviewed. No pertinent surgical history.      Social History     Socioeconomic History    Marital status:      Spouse name: Not on file    Number of children: Not on file    Years of education: Not on file    Highest education level: Not on file   Occupational History    Not on file   Tobacco Use    Smoking status: Current Every Day Smoker     Packs/day: 2.00     Years: 23.00     Pack years: 46.00     Types: Cigarettes    Smokeless tobacco: Never Used   Vaping Use    Vaping Use: Never used   Substance and Sexual Activity    Alcohol use: No    Drug use: No    Sexual activity: Yes     Partners: Female   Other Topics Concern    Not on file   Social History Narrative    Not on file     Social Determinants of Health     Financial Resource Strain:     Difficulty of Paying Living Expenses: Not on file   Food Insecurity:     Worried About [address] [employer] in the Last Year: Not on file    Ran Out of Food in the Last Year: Not on file   Transportation Needs:     Lack of Transportation (Medical): Not on file    Lack of Transportation (Non-Medical):  Not on file   Physical Activity:     Days of Exercise per Week: Not on file    Minutes of Exercise per Session: Not on file   Stress:     Feeling of Stress : Not on file   Social Connections:     Frequency of Communication with Friends and Family: Not on file    Frequency of Social Gatherings with Friends and Family: Not on file    Attends Jainism Services: Not on file    Active Member of [address] [place] [address] [employer] or Organizations: Not on file    Attends Club or Organization Meetings: Not on file    Marital Status: Not on file   Intimate Partner Violence:     Fear of Current or Ex-Partner: Not on file    Emotionally Abused: Not on file    Physically Abused: Not on file    Sexually Abused: Not on file   Housing Stability:     Unable to Pay for Housing in the Last Year: Not on file    Number of [place] in the Last Year: Not on file    Unstable Housing in the Last Year: Not on file        Current Outpatient Medications   Medication Sig Dispense Refill    simvastatin (ZOCOR) 40 MG tablet       divalproex (DEPAKOTE) 500 MG DR tablet Take 1 tablet by mouth 2 times daily 60 tablet 0    phenytoin (PHENYTEK) 300 MG ER capsule Take 1 capsule by mouth daily Unsure of dose 30 capsule 2    acetaminophen (APAP EXTRA STRENGTH) 500 MG tablet Take 2 tablets by mouth every 6 hours as needed for Pain (Patient not taking: Reported on 6/22/2021) 30 tablet 0    ibuprofen (ADVIL;MOTRIN) 600 MG tablet Take 1 tablet by mouth every 6 hours as needed for Pain (Patient not taking: Reported on 6/22/2021) 15 tablet 0    penicillin v potassium (VEETID) 500 MG tablet Take 1 tablet by mouth 4 times daily (Patient not taking: Reported on 6/22/2021) 40 tablet 0    furosemide (LASIX) 20 MG tablet Take 20 mg by mouth daily (Patient not taking: Reported on 6/22/2021)  dicyclomine (BENTYL) 20 MG tablet Take 1 tablet by mouth every 6 hours (Patient not taking: Reported on 6/22/2021) 15 tablet 0    ondansetron (ZOFRAN ODT) 4 MG disintegrating tablet Take 1 tablet by mouth every 8 hours as needed for Nausea (Patient not taking: Reported on 6/22/2021) 10 tablet 0     No current facility-administered medications for this visit. Allergies  No Known Allergies     REVIEW OF SYSTEMS:     Review of Systems   Constitutional: Positive for fatigue. Negative for appetite change, chills, fever and unexpected weight change. Eyes: Negative for photophobia, pain and visual disturbance. Respiratory: Negative for cough and shortness of breath. Cardiovascular: Negative for chest pain and palpitations. Gastrointestinal: Negative for abdominal pain, constipation, diarrhea, nausea and vomiting. Endocrine: Negative for polydipsia and polyuria. Genitourinary: Negative for difficulty urinating, dysuria and hematuria. Skin: Negative for pallor and rash. Neurological: Positive for seizures. Negative for dizziness, tremors, syncope, facial asymmetry, speech difficulty, weakness, light-headedness, numbness and headaches. Psychiatric/Behavioral: Positive for confusion, decreased concentration, dysphoric mood and sleep disturbance. Negative for behavioral problems and hallucinations. VITALS  /70   Pulse 80   Ht 5' 10\" (1.778 m)   Wt 200 lb (90.7 kg)   SpO2 98%   BMI 28.70 kg/m²      PHYSICAL EXAMINATION:     Constitutional: Well developed, well nourished, not in distress  Head:  normocephalic, atraumatic.   Neck: supple, no carotid bruits,  Respiratory: Clear to auscultation bilaterally   Cardiovascular: normal rate, regular rhythm,   Abdomen: Soft, nontender, nondistended,   Extremities:  peripheral pulses palpable, no pedal edema   Psych: normal affect      NEUROLOGICAL EXAMINATION:     Mental status   Alert and oriented; impaired memory with no confusion, speech or language problems; no hallucinations or delusions     Cranial nerves   II - visual fields intact to confrontation                                                III, IV, VI  extra-ocular muscles full: no pupillary defect; no ANDRIY, no nystagmus, no ptosis   V - normal facial sensation                                                               VII - normal facial symmetry                                                             VIII - intact hearing                                                                             IX, X - symmetrical palate                                                                  XI - symmetrical shoulder shrug                                                       XII - midline tongue without atrophy or fasciculation     Motor function  Normal muscle bulk and tone  Muscle strength: normal power 5/5       Sensory function Intact to touch in bilateral upper and lower extremities. Cerebellar  no tremors or involuntary movements noted. Reflex function Intact 1+ DTR and symmetric. Negative Babinski     Gait                  Normal station and gait. Patient was able to perform toe, heel and tandem walking without much difficulty. PRIOR TESTS AND IMAGING: Following images and Labs were reviewed by the examiner       MRI brain with and without contrast: None      Routine EE2011 in care everywhere. Clinical interpretation:  This inpatient electroencephalogram, obtained  during wakefulness and drowsiness, is abnormal due to slowing of the  background, suggesting mild nonspecific encephalopathy due to metabolic,  toxic, pharmacological diffuse structural etiology. Routine EEG 2010 in care everywhere. Clinical Impression - This is an abnormal outpatient EEG.   Technically, the  presence of diffuse background slowing and generalized appearing  discharges.  The slowing is indicative of a mild encephalopathy of  nonspecific etiology.  The primary appearing generalized discharges may be  indicative of a lower threshold for generalized onset seizures, although  they can be seen asymptomatically in family members of patients with  generalized epilepsy.  Careful clinical correlation is recommended. Results for Claritza Troncoso (MRN [id number]) as of 9/27/2019 13:33   Ref. Range 7/22/2019 10:35 7/31/2019 16:35   Phenytoin Lvl Latest Ref Range: 10.0 - 20.0 ug/mL 12.6 12.7   Phenytoin, Free Latest Ref Range: 1.0 - 2.0 ug/mL 1.7 1.3   Valproic Acid Lvl Latest Ref Range: 50 - 125 ug/mL  35 (L)   Valproic Acid, Free Latest Ref Range: 7.0 - 23.0 ug/mL  2.0 (L)       ASSESSMENT / PLAN:     Mihai Gusman is a 37 y.o. left handed  male with PMH significant for Seizures, current smoker and depression was seen in the clinic for seizures    · History of seizure disorders, unclear etiology. Seizures currently well controlled, last seizure was more than 10 years ago. · History of depression  · Current smoker  · Sleep disturbances  · Medication noncompliance      Plan:    Patient is very noncompliant with his medication. Currently taking phenytoin  mg daily and valproic acid  mg twice a day. His valproic acid level was low and hence his dose of Depakote was increased to 750 mg twice a day during last clinic visit, but he still continues to take valproic acid  mg twice a day. He was again instructed patient to increase the dose of valproic acid to 750 mg twice a day. A script was sent to his pharmacy of choice. - Seizure Precautions:   I counseled the patient with regard to seizure precautions for injury prevention and reinforced their rationale. No driving for at least 6 months, no activity at dangerous heights, no operation of dangerous machinery, no baths, no swimming. Caution (preferably accompanied) with cooking or near fires.  The patient expressed understanding.     - Maintain seizure diary  a. seizures that occur, duration and description (or type)  b. any side effects from medications and medication dosage  c. mood or behavioral changes  d. Changes in other medications, or Significant life events    - Follow up in the clinic in 2 months  - Instructed patient to call the clinic if symptoms worsen or develop any new symptoms. I have spent 32 minutes  with the patient more than 50% of this time was spent reviewing medical records, discussing imaging findings, counseling regarding medications side effects and compliance, healthy habits and coordinating care.         David Pablo MD, [facility]  PGY-4 Neurology   4/28/2022 at 3:41 PM

## 2022-05-17 ENCOUNTER — HOSPITAL ENCOUNTER (EMERGENCY)
Age: 47
Discharge: HOME | End: 2022-05-18
Attending: EMERGENCY MEDICINE
Payer: MEDICAID

## 2022-05-17 ENCOUNTER — APPOINTMENT (OUTPATIENT)
Dept: GENERAL RADIOLOGY | Age: 47
End: 2022-05-17
Payer: MEDICAID
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
## 2022-05-17 DIAGNOSIS — S82.851A CLOSED TRIMALLEOLAR FRACTURE OF RIGHT ANKLE, INITIAL ENCOUNTER: Primary | ICD-10-CM

## 2022-05-17 LAB
ABSOLUTE EOS #: 0.12 K/UL (ref 0–0.44)
ABSOLUTE IMMATURE GRANULOCYTE: <0.03 K/UL (ref 0–0.3)
ABSOLUTE LYMPH #: 2.55 K/UL (ref 1.1–3.7)
ABSOLUTE MONO #: 0.65 K/UL (ref 0.1–1.2)
ANION GAP SERPL CALCULATED.3IONS-SCNC: 9 MMOL/L (ref 9–17)
BASOPHILS # BLD: 1 % (ref 0–2)
BASOPHILS ABSOLUTE: 0.04 K/UL (ref 0–0.2)
BUN BLDV-MCNC: 7 MG/DL (ref 6–20)
CALCIUM SERPL-MCNC: 9 MG/DL (ref 8.6–10.4)
CHLORIDE BLD-SCNC: 109 MMOL/L (ref 98–107)
CO2: 26 MMOL/L (ref 20–31)
CREAT SERPL-MCNC: 0.82 MG/DL (ref 0.7–1.2)
EOSINOPHILS RELATIVE PERCENT: 2 % (ref 1–4)
GFR AFRICAN AMERICAN: >60 ML/MIN
GFR NON-AFRICAN AMERICAN: >60 ML/MIN
GFR SERPL CREATININE-BSD FRML MDRD: ABNORMAL ML/MIN/{1.73_M2}
GLUCOSE BLD-MCNC: 155 MG/DL (ref 70–99)
HCT VFR BLD CALC: 43.9 % (ref 40.7–50.3)
HEMOGLOBIN: 14.8 G/DL (ref 13–17)
IMMATURE GRANULOCYTES: 0 %
INR BLD: 1
LYMPHOCYTES # BLD: 36 % (ref 24–43)
MCH RBC QN AUTO: 33 PG (ref 25.2–33.5)
MCHC RBC AUTO-ENTMCNC: 33.7 G/DL (ref 28.4–34.8)
MCV RBC AUTO: 98 FL (ref 82.6–102.9)
MONOCYTES # BLD: 9 % (ref 3–12)
NRBC AUTOMATED: 0 PER 100 WBC
PARTIAL THROMBOPLASTIN TIME: 21.2 SEC (ref 20.5–30.5)
PDW BLD-RTO: 12 % (ref 11.8–14.4)
PLATELET # BLD: 213 K/UL (ref 138–453)
PMV BLD AUTO: 10.7 FL (ref 8.1–13.5)
POTASSIUM SERPL-SCNC: 3.5 MMOL/L (ref 3.7–5.3)
PROTHROMBIN TIME: 10.7 SEC (ref 9.1–12.3)
RBC # BLD: 4.48 M/UL (ref 4.21–5.77)
SEG NEUTROPHILS: 52 % (ref 36–65)
SEGMENTED NEUTROPHILS ABSOLUTE COUNT: 3.64 K/UL (ref 1.5–8.1)
SODIUM BLD-SCNC: 144 MMOL/L (ref 135–144)
WBC # BLD: 7 K/UL (ref 3.5–11.3)

## 2022-05-17 PROCEDURE — 99284 EMERGENCY DEPT VISIT MOD MDM: CPT

## 2022-05-17 PROCEDURE — 6360000002 HC RX W HCPCS: Performed by: STUDENT IN AN ORGANIZED HEALTH CARE EDUCATION/TRAINING PROGRAM

## 2022-05-17 PROCEDURE — 27818 TREATMENT OF ANKLE FRACTURE: CPT

## 2022-05-17 PROCEDURE — 96376 TX/PRO/DX INJ SAME DRUG ADON: CPT

## 2022-05-17 PROCEDURE — 73610 X-RAY EXAM OF ANKLE: CPT

## 2022-05-17 PROCEDURE — 96361 HYDRATE IV INFUSION ADD-ON: CPT

## 2022-05-17 PROCEDURE — 85025 COMPLETE CBC W/AUTO DIFF WBC: CPT

## 2022-05-17 PROCEDURE — 96375 TX/PRO/DX INJ NEW DRUG ADDON: CPT

## 2022-05-17 PROCEDURE — 80048 BASIC METABOLIC PNL TOTAL CA: CPT

## 2022-05-17 PROCEDURE — 85730 THROMBOPLASTIN TIME PARTIAL: CPT

## 2022-05-17 PROCEDURE — 96374 THER/PROPH/DIAG INJ IV PUSH: CPT

## 2022-05-17 PROCEDURE — 85610 PROTHROMBIN TIME: CPT

## 2022-05-17 PROCEDURE — 73590 X-RAY EXAM OF LOWER LEG: CPT

## 2022-05-17 RX ORDER — ONDANSETRON 2 MG/ML
4 INJECTION INTRAMUSCULAR; INTRAVENOUS ONCE
Status: COMPLETED | OUTPATIENT
Start: 2022-05-17 | End: 2022-05-17

## 2022-05-17 RX ORDER — MIDAZOLAM HYDROCHLORIDE 2 MG/2ML
2 INJECTION, SOLUTION INTRAMUSCULAR; INTRAVENOUS ONCE
Status: COMPLETED | OUTPATIENT
Start: 2022-05-17 | End: 2022-05-17

## 2022-05-17 RX ORDER — FENTANYL CITRATE 50 UG/ML
50 INJECTION, SOLUTION INTRAMUSCULAR; INTRAVENOUS ONCE
Status: COMPLETED | OUTPATIENT
Start: 2022-05-17 | End: 2022-05-17

## 2022-05-17 RX ADMIN — MIDAZOLAM HYDROCHLORIDE 2 MG: 1 INJECTION, SOLUTION INTRAMUSCULAR; INTRAVENOUS at 23:22

## 2022-05-17 RX ADMIN — ONDANSETRON 4 MG: 2 INJECTION INTRAMUSCULAR; INTRAVENOUS at 23:22

## 2022-05-17 RX ADMIN — FENTANYL CITRATE 50 MCG: 50 INJECTION, SOLUTION INTRAMUSCULAR; INTRAVENOUS at 23:22

## 2022-05-17 ASSESSMENT — PAIN SCALES - GENERAL: PAINLEVEL_OUTOF10: 10

## 2022-05-18 ENCOUNTER — APPOINTMENT (OUTPATIENT)
Dept: GENERAL RADIOLOGY | Age: 47
End: 2022-05-18
Payer: MEDICAID

## 2022-05-18 ENCOUNTER — APPOINTMENT (OUTPATIENT)
Dept: CT IMAGING | Age: 47
End: 2022-05-18
Payer: MEDICAID

## 2022-05-18 VITALS
RESPIRATION RATE: 18 BRPM | BODY MASS INDEX: 29.41 KG/M2 | SYSTOLIC BLOOD PRESSURE: 143 MMHG | HEART RATE: 68 BPM | OXYGEN SATURATION: 96 % | TEMPERATURE: 99.1 F | DIASTOLIC BLOOD PRESSURE: 81 MMHG | WEIGHT: 205 LBS

## 2022-05-18 PROCEDURE — 73630 X-RAY EXAM OF FOOT: CPT

## 2022-05-18 PROCEDURE — 6360000002 HC RX W HCPCS: Performed by: STUDENT IN AN ORGANIZED HEALTH CARE EDUCATION/TRAINING PROGRAM

## 2022-05-18 PROCEDURE — 73600 X-RAY EXAM OF ANKLE: CPT

## 2022-05-18 PROCEDURE — 73610 X-RAY EXAM OF ANKLE: CPT

## 2022-05-18 PROCEDURE — 73700 CT LOWER EXTREMITY W/O DYE: CPT

## 2022-05-18 PROCEDURE — 2500000003 HC RX 250 WO HCPCS: Performed by: STUDENT IN AN ORGANIZED HEALTH CARE EDUCATION/TRAINING PROGRAM

## 2022-05-18 PROCEDURE — 6370000000 HC RX 637 (ALT 250 FOR IP): Performed by: STUDENT IN AN ORGANIZED HEALTH CARE EDUCATION/TRAINING PROGRAM

## 2022-05-18 PROCEDURE — 2580000003 HC RX 258: Performed by: STUDENT IN AN ORGANIZED HEALTH CARE EDUCATION/TRAINING PROGRAM

## 2022-05-18 RX ORDER — LIDOCAINE HYDROCHLORIDE 10 MG/ML
INJECTION, SOLUTION INFILTRATION; PERINEURAL
Status: DISCONTINUED
Start: 2022-05-18 | End: 2022-05-18 | Stop reason: HOSPADM

## 2022-05-18 RX ORDER — 0.9 % SODIUM CHLORIDE 0.9 %
1000 INTRAVENOUS SOLUTION INTRAVENOUS ONCE
Status: COMPLETED | OUTPATIENT
Start: 2022-05-18 | End: 2022-05-18

## 2022-05-18 RX ORDER — IBUPROFEN 800 MG/1
800 TABLET ORAL ONCE
Status: COMPLETED | OUTPATIENT
Start: 2022-05-18 | End: 2022-05-18

## 2022-05-18 RX ORDER — OXYCODONE HYDROCHLORIDE 5 MG/1
5 TABLET ORAL EVERY 8 HOURS PRN
Qty: 15 TABLET | Refills: 0 | Status: SHIPPED | OUTPATIENT
Start: 2022-05-18 | End: 2022-05-25

## 2022-05-18 RX ORDER — ACETAMINOPHEN 325 MG/1
325 TABLET ORAL EVERY 6 HOURS PRN
Qty: 28 TABLET | Refills: 0 | Status: SHIPPED | OUTPATIENT
Start: 2022-05-18 | End: 2022-10-28

## 2022-05-18 RX ORDER — IBUPROFEN 600 MG/1
600 TABLET ORAL EVERY 8 HOURS PRN
Qty: 21 TABLET | Refills: 0 | Status: SHIPPED | OUTPATIENT
Start: 2022-05-18 | End: 2022-10-28

## 2022-05-18 RX ORDER — MIDAZOLAM HYDROCHLORIDE 2 MG/2ML
2 INJECTION, SOLUTION INTRAMUSCULAR; INTRAVENOUS ONCE
Status: COMPLETED | OUTPATIENT
Start: 2022-05-18 | End: 2022-05-18

## 2022-05-18 RX ORDER — LIDOCAINE HYDROCHLORIDE 10 MG/ML
20 INJECTION, SOLUTION INFILTRATION; PERINEURAL ONCE
Status: COMPLETED | OUTPATIENT
Start: 2022-05-18 | End: 2022-05-18

## 2022-05-18 RX ORDER — FENTANYL CITRATE 50 UG/ML
50 INJECTION, SOLUTION INTRAMUSCULAR; INTRAVENOUS ONCE
Status: DISCONTINUED | OUTPATIENT
Start: 2022-05-18 | End: 2022-05-18 | Stop reason: HOSPADM

## 2022-05-18 RX ADMIN — MIDAZOLAM HYDROCHLORIDE 2 MG: 1 INJECTION, SOLUTION INTRAMUSCULAR; INTRAVENOUS at 00:28

## 2022-05-18 RX ADMIN — LIDOCAINE HYDROCHLORIDE 20 ML: 10 INJECTION, SOLUTION INFILTRATION; PERINEURAL at 00:28

## 2022-05-18 RX ADMIN — IBUPROFEN 800 MG: 800 TABLET, FILM COATED ORAL at 02:53

## 2022-05-18 RX ADMIN — SODIUM CHLORIDE 1000 ML: 9 INJECTION, SOLUTION INTRAVENOUS at 02:02
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
## 2022-05-18 NOTE — ED PROVIDER NOTES
Faculty Sign-Out Attestation  Handoff taken on the following patient from prior Attending Physician: Octavio Mcgovern    I was available and discussed any additional care issues that arose and coordinated the management plans with the resident(s) caring for the patient during my duty period. Any areas of disagreement with residents documentation of care or procedures are noted on the chart. I was personally present for the key portions of any/all procedures during my duty period. I have documented in the chart those procedures where I was not present during the key portions.     fx deformity R ankle,   Reduced obtaining re xray,   Ortho to see / direct    Latjoey Espinozating, DO  Attending Physician     Zakiya Vines, DO  05/18/22 0004    Splinted, > referred for out pt OR follow up,        Zakiya Vines, DO  05/18/22 1107

## 2022-05-18 NOTE — ED NOTES
Pt placed on 2 L nasal cannula for SpO2 of 88%.  Pt saturation now 95%     Mitra Javier RN  05/18/22 0004
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
## 2022-05-20 ENCOUNTER — TELEPHONE (OUTPATIENT)
Dept: ORTHOPEDIC SURGERY | Age: 47
End: 2022-05-20

## 2022-05-20 DIAGNOSIS — S82.891A CLOSED FRACTURE OF RIGHT ANKLE, INITIAL ENCOUNTER: Primary | ICD-10-CM
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
## 2022-05-22 ENCOUNTER — HOSPITAL ENCOUNTER (EMERGENCY)
Age: 47
Discharge: HOME | End: 2022-05-22
Attending: EMERGENCY MEDICINE
Payer: MEDICAID

## 2022-05-22 VITALS
SYSTOLIC BLOOD PRESSURE: 144 MMHG | TEMPERATURE: 99.1 F | RESPIRATION RATE: 14 BRPM | OXYGEN SATURATION: 98 % | HEART RATE: 85 BPM | DIASTOLIC BLOOD PRESSURE: 81 MMHG

## 2022-05-22 DIAGNOSIS — T30.0 CONTACT BURN: ICD-10-CM

## 2022-05-22 DIAGNOSIS — S82.891D CLOSED FRACTURE OF RIGHT ANKLE WITH ROUTINE HEALING, SUBSEQUENT ENCOUNTER: Primary | ICD-10-CM

## 2022-05-22 PROCEDURE — 99283 EMERGENCY DEPT VISIT LOW MDM: CPT

## 2022-05-22 PROCEDURE — 6370000000 HC RX 637 (ALT 250 FOR IP): Performed by: EMERGENCY MEDICINE

## 2022-05-22 RX ORDER — OXYCODONE HYDROCHLORIDE AND ACETAMINOPHEN 5; 325 MG/1; MG/1
2 TABLET ORAL ONCE
Status: COMPLETED | OUTPATIENT
Start: 2022-05-22 | End: 2022-05-22

## 2022-05-22 RX ADMIN — OXYCODONE HYDROCHLORIDE AND ACETAMINOPHEN 2 TABLET: 5; 325 TABLET ORAL at 20:49

## 2022-05-22 ASSESSMENT — ENCOUNTER SYMPTOMS: COLOR CHANGE: 1

## 2022-05-22 ASSESSMENT — PAIN SCALES - GENERAL: PAINLEVEL_OUTOF10: 10
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
## 2022-05-23 NOTE — ED NOTES
..Patient in need of transportation home. SW contacted [facility]. ETA unknown.  #[id number]

## 2022-05-26 ENCOUNTER — OFFICE VISIT (OUTPATIENT)
Dept: ORTHOPEDIC SURGERY | Age: 47
End: 2022-05-26
Payer: MEDICAID

## 2022-05-26 VITALS — HEIGHT: 70 IN | WEIGHT: 205 LBS | BODY MASS INDEX: 29.35 KG/M2

## 2022-05-26 DIAGNOSIS — S82.851A CLOSED DISPLACED TRIMALLEOLAR FRACTURE OF RIGHT ANKLE, INITIAL ENCOUNTER: Primary | ICD-10-CM

## 2022-05-26 PROCEDURE — G8427 DOCREV CUR MEDS BY ELIG CLIN: HCPCS | Performed by: ORTHOPAEDIC SURGERY

## 2022-05-26 PROCEDURE — G8417 CALC BMI ABV UP PARAM F/U: HCPCS | Performed by: ORTHOPAEDIC SURGERY

## 2022-05-26 PROCEDURE — 99204 OFFICE O/P NEW MOD 45 MIN: CPT | Performed by: ORTHOPAEDIC SURGERY

## 2022-05-26 PROCEDURE — 4004F PT TOBACCO SCREEN RCVD TLK: CPT | Performed by: ORTHOPAEDIC SURGERY
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
## 2022-05-26 NOTE — LETTER
[facility]  [address] [facility]  Phone: [phone]  Fax: [phone]    Apolinar Zheng DO        May 26, 2022     Patient: Elpidio Mota   YOB: 1975   Date of Visit: 5/26/2022       To Whom it May Concern:    Elpidio Mota was seen in my clinic on 5/26/2022. He was determined to have injuries that require emergency surgery on his right ankle. Please excuse from any court proceedings on 2/27/2022. If you have any questions or concerns, please don't hesitate to call.     Sincerely,         Apolinar Zheng DO

## 2022-05-27 ENCOUNTER — HOSPITAL ENCOUNTER (OUTPATIENT)
Age: 47
Setting detail: OUTPATIENT SURGERY
Discharge: HOME | End: 2022-05-27
Attending: ORTHOPAEDIC SURGERY | Admitting: ORTHOPAEDIC SURGERY
Payer: MEDICAID

## 2022-05-27 ENCOUNTER — APPOINTMENT (OUTPATIENT)
Dept: GENERAL RADIOLOGY | Age: 47
End: 2022-05-27
Attending: ORTHOPAEDIC SURGERY
Payer: MEDICAID

## 2022-05-27 ENCOUNTER — ANESTHESIA (OUTPATIENT)
Dept: OPERATING ROOM | Age: 47
End: 2022-05-27
Payer: MEDICAID

## 2022-05-27 ENCOUNTER — ANESTHESIA EVENT (OUTPATIENT)
Dept: OPERATING ROOM | Age: 47
End: 2022-05-27
Payer: MEDICAID

## 2022-05-27 VITALS
OXYGEN SATURATION: 96 % | TEMPERATURE: 97.2 F | BODY MASS INDEX: 34.22 KG/M2 | DIASTOLIC BLOOD PRESSURE: 87 MMHG | RESPIRATION RATE: 10 BRPM | HEIGHT: 70 IN | HEART RATE: 73 BPM | SYSTOLIC BLOOD PRESSURE: 156 MMHG | WEIGHT: 239 LBS

## 2022-05-27 DIAGNOSIS — G89.18 POST-OPERATIVE PAIN: Primary | ICD-10-CM

## 2022-05-27 PROCEDURE — 6360000002 HC RX W HCPCS

## 2022-05-27 PROCEDURE — 2500000003 HC RX 250 WO HCPCS

## 2022-05-27 PROCEDURE — 20692 APPL MLTPLN UNI EXT FIXJ SYS: CPT | Performed by: ORTHOPAEDIC SURGERY

## 2022-05-27 PROCEDURE — 3209999900 FLUORO FOR SURGICAL PROCEDURES

## 2022-05-27 PROCEDURE — 3700000000 HC ANESTHESIA ATTENDED CARE: Performed by: ORTHOPAEDIC SURGERY

## 2022-05-27 PROCEDURE — 27818 TREATMENT OF ANKLE FRACTURE: CPT | Performed by: ORTHOPAEDIC SURGERY

## 2022-05-27 PROCEDURE — 2709999900 HC NON-CHARGEABLE SUPPLY: Performed by: ORTHOPAEDIC SURGERY

## 2022-05-27 PROCEDURE — 7100000010 HC PHASE II RECOVERY - FIRST 15 MIN: Performed by: ORTHOPAEDIC SURGERY

## 2022-05-27 PROCEDURE — C1713 ANCHOR/SCREW BN/BN,TIS/BN: HCPCS | Performed by: ORTHOPAEDIC SURGERY

## 2022-05-27 PROCEDURE — 2720000010 HC SURG SUPPLY STERILE: Performed by: ORTHOPAEDIC SURGERY

## 2022-05-27 PROCEDURE — 2580000003 HC RX 258: Performed by: ANESTHESIOLOGY

## 2022-05-27 PROCEDURE — 3600000004 HC SURGERY LEVEL 4 BASE: Performed by: ORTHOPAEDIC SURGERY

## 2022-05-27 PROCEDURE — 6370000000 HC RX 637 (ALT 250 FOR IP): Performed by: ANESTHESIOLOGY

## 2022-05-27 PROCEDURE — 7100000000 HC PACU RECOVERY - FIRST 15 MIN: Performed by: ORTHOPAEDIC SURGERY

## 2022-05-27 PROCEDURE — 73610 X-RAY EXAM OF ANKLE: CPT

## 2022-05-27 PROCEDURE — 3600000014 HC SURGERY LEVEL 4 ADDTL 15MIN: Performed by: ORTHOPAEDIC SURGERY

## 2022-05-27 PROCEDURE — 7100000011 HC PHASE II RECOVERY - ADDTL 15 MIN: Performed by: ORTHOPAEDIC SURGERY

## 2022-05-27 PROCEDURE — 2580000003 HC RX 258: Performed by: ORTHOPAEDIC SURGERY

## 2022-05-27 PROCEDURE — 3700000001 HC ADD 15 MINUTES (ANESTHESIA): Performed by: ORTHOPAEDIC SURGERY

## 2022-05-27 PROCEDURE — 6360000002 HC RX W HCPCS: Performed by: ANESTHESIOLOGY

## 2022-05-27 PROCEDURE — 7100000001 HC PACU RECOVERY - ADDTL 15 MIN: Performed by: ORTHOPAEDIC SURGERY

## 2022-05-27 RX ORDER — HYDRALAZINE HYDROCHLORIDE 20 MG/ML
10 INJECTION INTRAMUSCULAR; INTRAVENOUS
Status: DISCONTINUED | OUTPATIENT
Start: 2022-05-27 | End: 2022-05-27 | Stop reason: HOSPADM

## 2022-05-27 RX ORDER — CYCLOBENZAPRINE HCL 10 MG
10 TABLET ORAL EVERY 6 HOURS PRN
Qty: 50 TABLET | Refills: 0 | Status: SHIPPED | OUTPATIENT
Start: 2022-05-27 | End: 2022-06-06

## 2022-05-27 RX ORDER — SODIUM CHLORIDE 0.9 % (FLUSH) 0.9 %
5-40 SYRINGE (ML) INJECTION PRN
Status: DISCONTINUED | OUTPATIENT
Start: 2022-05-27 | End: 2022-05-27 | Stop reason: HOSPADM

## 2022-05-27 RX ORDER — OXYCODONE HYDROCHLORIDE 5 MG/1
5-10 TABLET ORAL EVERY 6 HOURS PRN
Qty: 30 TABLET | Refills: 0 | Status: SHIPPED | OUTPATIENT
Start: 2022-05-27 | End: 2022-06-01

## 2022-05-27 RX ORDER — ACETAMINOPHEN 500 MG
1000 TABLET ORAL EVERY 6 HOURS PRN
Qty: 112 TABLET | Refills: 0 | Status: SHIPPED | OUTPATIENT
Start: 2022-05-27 | End: 2022-10-28

## 2022-05-27 RX ORDER — DOCUSATE SODIUM 100 MG/1
100 CAPSULE, LIQUID FILLED ORAL 2 TIMES DAILY
Qty: 20 CAPSULE | Refills: 0 | Status: SHIPPED | OUTPATIENT
Start: 2022-05-27

## 2022-05-27 RX ORDER — DROPERIDOL 2.5 MG/ML
0.62 INJECTION, SOLUTION INTRAMUSCULAR; INTRAVENOUS
Status: DISCONTINUED | OUTPATIENT
Start: 2022-05-27 | End: 2022-05-27 | Stop reason: HOSPADM

## 2022-05-27 RX ORDER — SODIUM CHLORIDE, SODIUM LACTATE, POTASSIUM CHLORIDE, CALCIUM CHLORIDE 600; 310; 30; 20 MG/100ML; MG/100ML; MG/100ML; MG/100ML
INJECTION, SOLUTION INTRAVENOUS CONTINUOUS
Status: DISCONTINUED | OUTPATIENT
Start: 2022-05-27 | End: 2022-05-27 | Stop reason: HOSPADM

## 2022-05-27 RX ORDER — FENTANYL CITRATE 50 UG/ML
INJECTION, SOLUTION INTRAMUSCULAR; INTRAVENOUS PRN
Status: DISCONTINUED | OUTPATIENT
Start: 2022-05-27 | End: 2022-05-27 | Stop reason: SDUPTHER

## 2022-05-27 RX ORDER — OXYCODONE HYDROCHLORIDE 5 MG/1
5 TABLET ORAL ONCE
Status: COMPLETED | OUTPATIENT
Start: 2022-05-27 | End: 2022-05-27

## 2022-05-27 RX ORDER — SODIUM CHLORIDE 9 MG/ML
INJECTION, SOLUTION INTRAVENOUS PRN
Status: DISCONTINUED | OUTPATIENT
Start: 2022-05-27 | End: 2022-05-27 | Stop reason: HOSPADM

## 2022-05-27 RX ORDER — METOCLOPRAMIDE HYDROCHLORIDE 5 MG/ML
10 INJECTION INTRAMUSCULAR; INTRAVENOUS
Status: DISCONTINUED | OUTPATIENT
Start: 2022-05-27 | End: 2022-05-27 | Stop reason: HOSPADM

## 2022-05-27 RX ORDER — MIDAZOLAM HYDROCHLORIDE 1 MG/ML
INJECTION INTRAMUSCULAR; INTRAVENOUS PRN
Status: DISCONTINUED | OUTPATIENT
Start: 2022-05-27 | End: 2022-05-27 | Stop reason: SDUPTHER

## 2022-05-27 RX ORDER — DEXAMETHASONE SODIUM PHOSPHATE 10 MG/ML
INJECTION INTRAMUSCULAR; INTRAVENOUS PRN
Status: DISCONTINUED | OUTPATIENT
Start: 2022-05-27 | End: 2022-05-27 | Stop reason: SDUPTHER

## 2022-05-27 RX ORDER — NAPROXEN 500 MG/1
500 TABLET ORAL EVERY 12 HOURS PRN
Qty: 28 TABLET | Refills: 0 | Status: SHIPPED | OUTPATIENT
Start: 2022-05-27 | End: 2022-10-28

## 2022-05-27 RX ORDER — LIDOCAINE HYDROCHLORIDE 10 MG/ML
INJECTION, SOLUTION EPIDURAL; INFILTRATION; INTRACAUDAL; PERINEURAL PRN
Status: DISCONTINUED | OUTPATIENT
Start: 2022-05-27 | End: 2022-05-27 | Stop reason: SDUPTHER

## 2022-05-27 RX ORDER — SODIUM CHLORIDE 0.9 % (FLUSH) 0.9 %
5-40 SYRINGE (ML) INJECTION EVERY 12 HOURS SCHEDULED
Status: DISCONTINUED | OUTPATIENT
Start: 2022-05-27 | End: 2022-05-27 | Stop reason: HOSPADM

## 2022-05-27 RX ORDER — DIPHENHYDRAMINE HYDROCHLORIDE 50 MG/ML
12.5 INJECTION INTRAMUSCULAR; INTRAVENOUS
Status: DISCONTINUED | OUTPATIENT
Start: 2022-05-27 | End: 2022-05-27 | Stop reason: HOSPADM

## 2022-05-27 RX ORDER — LIDOCAINE HYDROCHLORIDE 10 MG/ML
1 INJECTION, SOLUTION EPIDURAL; INFILTRATION; INTRACAUDAL; PERINEURAL
Status: DISCONTINUED | OUTPATIENT
Start: 2022-05-27 | End: 2022-05-27 | Stop reason: HOSPADM

## 2022-05-27 RX ORDER — MEPERIDINE HYDROCHLORIDE 50 MG/ML
12.5 INJECTION INTRAMUSCULAR; INTRAVENOUS; SUBCUTANEOUS EVERY 5 MIN PRN
Status: DISCONTINUED | OUTPATIENT
Start: 2022-05-27 | End: 2022-05-27 | Stop reason: HOSPADM

## 2022-05-27 RX ORDER — MIDAZOLAM HYDROCHLORIDE 2 MG/2ML
1 INJECTION, SOLUTION INTRAMUSCULAR; INTRAVENOUS EVERY 10 MIN PRN
Status: DISCONTINUED | OUTPATIENT
Start: 2022-05-27 | End: 2022-05-27 | Stop reason: HOSPADM

## 2022-05-27 RX ORDER — PROPOFOL 10 MG/ML
INJECTION, EMULSION INTRAVENOUS PRN
Status: DISCONTINUED | OUTPATIENT
Start: 2022-05-27 | End: 2022-05-27 | Stop reason: SDUPTHER

## 2022-05-27 RX ORDER — GABAPENTIN 100 MG/1
100 CAPSULE ORAL 3 TIMES DAILY
Qty: 42 CAPSULE | Refills: 0 | Status: SHIPPED | OUTPATIENT
Start: 2022-05-27 | End: 2022-06-10

## 2022-05-27 RX ORDER — NEOSTIGMINE METHYLSULFATE 5 MG/5 ML
SYRINGE (ML) INTRAVENOUS PRN
Status: DISCONTINUED | OUTPATIENT
Start: 2022-05-27 | End: 2022-05-27 | Stop reason: SDUPTHER

## 2022-05-27 RX ORDER — MAGNESIUM HYDROXIDE 1200 MG/15ML
LIQUID ORAL CONTINUOUS PRN
Status: COMPLETED | OUTPATIENT
Start: 2022-05-27 | End: 2022-05-27

## 2022-05-27 RX ORDER — GLYCOPYRROLATE 0.2 MG/ML
INJECTION INTRAMUSCULAR; INTRAVENOUS PRN
Status: DISCONTINUED | OUTPATIENT
Start: 2022-05-27 | End: 2022-05-27 | Stop reason: SDUPTHER

## 2022-05-27 RX ORDER — SODIUM CHLORIDE 9 MG/ML
25 INJECTION, SOLUTION INTRAVENOUS PRN
Status: DISCONTINUED | OUTPATIENT
Start: 2022-05-27 | End: 2022-05-27 | Stop reason: HOSPADM

## 2022-05-27 RX ORDER — ONDANSETRON 2 MG/ML
INJECTION INTRAMUSCULAR; INTRAVENOUS PRN
Status: DISCONTINUED | OUTPATIENT
Start: 2022-05-27 | End: 2022-05-27 | Stop reason: SDUPTHER

## 2022-05-27 RX ORDER — LABETALOL HYDROCHLORIDE 5 MG/ML
INJECTION, SOLUTION INTRAVENOUS PRN
Status: DISCONTINUED | OUTPATIENT
Start: 2022-05-27 | End: 2022-05-27 | Stop reason: SDUPTHER

## 2022-05-27 RX ORDER — ROCURONIUM BROMIDE 10 MG/ML
INJECTION, SOLUTION INTRAVENOUS PRN
Status: DISCONTINUED | OUTPATIENT
Start: 2022-05-27 | End: 2022-05-27 | Stop reason: SDUPTHER

## 2022-05-27 RX ADMIN — LIDOCAINE HYDROCHLORIDE 50 MG: 10 INJECTION, SOLUTION EPIDURAL; INFILTRATION; INTRACAUDAL; PERINEURAL at 14:51

## 2022-05-27 RX ADMIN — Medication 2 G: at 15:01

## 2022-05-27 RX ADMIN — SODIUM CHLORIDE, POTASSIUM CHLORIDE, SODIUM LACTATE AND CALCIUM CHLORIDE: 600; 310; 30; 20 INJECTION, SOLUTION INTRAVENOUS at 09:23

## 2022-05-27 RX ADMIN — DEXAMETHASONE SODIUM PHOSPHATE 10 MG: 10 INJECTION INTRAMUSCULAR; INTRAVENOUS at 15:05

## 2022-05-27 RX ADMIN — GLYCOPYRROLATE 0.2 MG: 0.2 INJECTION INTRAMUSCULAR; INTRAVENOUS at 16:38

## 2022-05-27 RX ADMIN — OXYCODONE HYDROCHLORIDE 5 MG: 5 TABLET ORAL at 18:09

## 2022-05-27 RX ADMIN — HYDROMORPHONE HYDROCHLORIDE 0.5 MG: 1 INJECTION, SOLUTION INTRAMUSCULAR; INTRAVENOUS; SUBCUTANEOUS at 17:43

## 2022-05-27 RX ADMIN — FENTANYL CITRATE 25 MCG: 50 INJECTION, SOLUTION INTRAMUSCULAR; INTRAVENOUS at 16:39

## 2022-05-27 RX ADMIN — FENTANYL CITRATE 50 MCG: 50 INJECTION, SOLUTION INTRAMUSCULAR; INTRAVENOUS at 16:00

## 2022-05-27 RX ADMIN — SODIUM CHLORIDE, POTASSIUM CHLORIDE, SODIUM LACTATE AND CALCIUM CHLORIDE: 600; 310; 30; 20 INJECTION, SOLUTION INTRAVENOUS at 15:09

## 2022-05-27 RX ADMIN — HYDROMORPHONE HYDROCHLORIDE 0.5 MG: 1 INJECTION, SOLUTION INTRAMUSCULAR; INTRAVENOUS; SUBCUTANEOUS at 17:08

## 2022-05-27 RX ADMIN — PROPOFOL 200 MG: 10 INJECTION, EMULSION INTRAVENOUS at 14:51

## 2022-05-27 RX ADMIN — Medication 4 MG: at 16:32

## 2022-05-27 RX ADMIN — FENTANYL CITRATE 100 MCG: 50 INJECTION, SOLUTION INTRAMUSCULAR; INTRAVENOUS at 14:51

## 2022-05-27 RX ADMIN — MIDAZOLAM HYDROCHLORIDE 2 MG: 1 INJECTION, SOLUTION INTRAMUSCULAR; INTRAVENOUS at 14:49

## 2022-05-27 RX ADMIN — ROCURONIUM BROMIDE 20 MG: 10 INJECTION INTRAVENOUS at 15:24

## 2022-05-27 RX ADMIN — GLYCOPYRROLATE 0.8 MG: 0.2 INJECTION INTRAMUSCULAR; INTRAVENOUS at 16:32

## 2022-05-27 RX ADMIN — Medication 5 MG: at 16:14

## 2022-05-27 RX ADMIN — Medication 1 MG: at 16:38

## 2022-05-27 RX ADMIN — ROCURONIUM BROMIDE 50 MG: 10 INJECTION INTRAVENOUS at 14:51

## 2022-05-27 RX ADMIN — FENTANYL CITRATE 50 MCG: 50 INJECTION, SOLUTION INTRAMUSCULAR; INTRAVENOUS at 15:32

## 2022-05-27 RX ADMIN — FENTANYL CITRATE 25 MCG: 50 INJECTION, SOLUTION INTRAMUSCULAR; INTRAVENOUS at 16:41

## 2022-05-27 RX ADMIN — ROCURONIUM BROMIDE 10 MG: 10 INJECTION INTRAVENOUS at 15:58

## 2022-05-27 RX ADMIN — ONDANSETRON 4 MG: 2 INJECTION INTRAMUSCULAR; INTRAVENOUS at 16:34

## 2022-05-27 ASSESSMENT — PAIN DESCRIPTION - ORIENTATION
ORIENTATION: RIGHT

## 2022-05-27 ASSESSMENT — PAIN - FUNCTIONAL ASSESSMENT: PAIN_FUNCTIONAL_ASSESSMENT: 0-10

## 2022-05-27 ASSESSMENT — PAIN DESCRIPTION - LOCATION
LOCATION: ANKLE

## 2022-05-27 ASSESSMENT — PAIN SCALES - GENERAL
PAINLEVEL_OUTOF10: 10

## 2022-05-27 ASSESSMENT — PAIN DESCRIPTION - DESCRIPTORS
DESCRIPTORS: JABBING;SHARP;STABBING
DESCRIPTORS: SHARP;STABBING
DESCRIPTORS: JABBING;STABBING;SHARP
DESCRIPTORS: STABBING;SHARP
DESCRIPTORS: STABBING;SHARP;JABBING
DESCRIPTORS: JABBING;SHARP
DESCRIPTORS: SHARP;STABBING

## 2022-05-27 ASSESSMENT — PAIN DESCRIPTION - PAIN TYPE: TYPE: SURGICAL PAIN

## 2022-05-27 ASSESSMENT — PAIN DESCRIPTION - FREQUENCY: FREQUENCY: CONTINUOUS
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
## 2022-05-27 NOTE — BRIEF OP NOTE
Brief Postoperative Note      Patient: Lew Pickering  YOB: 1975  MRN: [id number]   [facility]: [id number]     Date of Procedure: 5/27/2022    Pre-Op Diagnosis: Right trimalleolar ankle fracture    Post-Op Diagnosis: Right trimalleolar ankle fracture       Procedure(s):  1. Closed reduction right trimalleolar ankle fracture 51370  2.  Multiplanar external ring fixator application 86901    Surgeon(s): Justina Ricks DO    Assistant: Resident: Michele Fuentes DO; Christain Gitelman, DO; Ara Orlando DO    Anesthesia: General    Estimated Blood Loss (mL): 20 mL    Tourniquet time: None    Complications: None    Specimens: * No specimens in log *    Implants: Synthes maxframe with 4 fracture struts      Drains: * No LDAs found *    Findings: Right trimalleolar ankle fracture    Electronically signed by Justina Ricks DO on 5/27/2022 at 4:48 PM

## 2022-05-27 NOTE — PROGRESS NOTES
Pt has been sleeping most of the waiting time. Pt just woke up and was updated on surgery time. Pt asked if I could call his aunt and update her. Writer called.

## 2022-05-27 NOTE — PROGRESS NOTES
Writer contacted pt's Jade Huntley at [phone]. She states that she will be able to  the patient after the surgery.
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
## 2022-05-28 NOTE — ANESTHESIA POSTPROCEDURE EVALUATION
Department of Anesthesiology  Postprocedure Note    Patient: Vandana Spine  MRN: [id number]  YOB: 1975  Date of evaluation: 5/27/2022  Time:  9:27 PM     Procedure Summary     Date: 05/27/22 Room / Location: [address]    Anesthesia Start: 8533 Anesthesia Stop: 2681    Procedure: EX-FIX APPLICATION ANKLE RIGHT (Right Ankle) Diagnosis:       Closed traumatic displaced trimalleolar fracture of right ankle, initial encounter      (RIGHT TRIMALLEOLAR FRACTURE)    Surgeons: Joyce Aguila DO Responsible Provider: Mabel Padilla MD    Anesthesia Type: general ASA Status: 2          Anesthesia Type: No value filed. Samson Phase I: Samson Score: 8    Samson Phase II: Samson Score: 10    Last vitals: Reviewed and per EMR flowsheets.        Anesthesia Post Evaluation    Patient location during evaluation: PACU  Patient participation: complete - patient participated  Level of consciousness: awake  Pain score: 10 (same as pre)  Nausea & Vomiting: no nausea  Cardiovascular status: hemodynamically stable and hypertensive  Respiratory status: room air  Multimodal analgesia pain management approach

## 2022-05-29 NOTE — OP NOTE
[facility]                                OPERATIVE REPORT    PATIENT NAME: Damian Talavera                          :        1975  MED REC NO:   [id number]                             ROOM:  ACCOUNT NO:   [de-identified]                           ADMIT DATE: 2022  PROVIDER:     Sherry Pike    DATE OF PROCEDURE:  2022    PREOPERATIVE DIAGNOSIS:  Right trimalleolar ankle fracture dislocation. POSTOPERATIVE DIAGNOSIS:  Right trimalleolar ankle fracture dislocation. PROCEDURES:  1. Closed reduction of right trimalleolar ankle fracture dislocation  with manipulation, CPT 49518.  2.  Application of a multiplanar external ring fixator, CPT 57873. ATTENDING SURGEON:  Sherry Pike DO    ASSISTANTS:  [facility]  2. Francesca Pérez, PGY-2  3. Ventura Ribeiro DO, PGY-1    ANESTHESIA:  General.    ESTIMATED BLOOD LOSS:  20 mL. COMPLICATIONS:  None. SPECIMENS:  None. IMPLANTS:  Synthes MAXFRAME components with four fracture struts. INDICATIONS:  The patient is a 59-year-old male who originally sustained  a right trimalleolar ankle fracture dislocation nine days prior to this  procedure. The patient underwent appropriate closed reduction and  splint application and was instructed to follow up in the outpatient  clinic. The patient missed his initial followup visit and was in the ED  the following weekend. Orthopedics was not consulted; however, it was  documented in the chart with clinical photos that the patient developed  multiple fracture blisters and the splint was taken off, although no  x-rays were obtained at that time. When he did eventually come to the  clinic, he was not in any type of immobilization. He was extremely  swollen and had multiple hemorrhagic fracture blisters in both the  medial and lateral aspects of the ankle.   Radiographs at that time  showed repeat severe lateral subluxation of the tibiotalar joint as  well. In order to maintain a reduction and allow the soft tissues to  recover, we recommended operative interventions with placement of an  external fixator device. Due to the patient demonstrated noncompliance  and two packs per day smoking history, we felt that a circular external  fixator would not only be better suited to maintain the reduction, but  also needed to be more durable to survive the patient's noncompliance. We discussed this with the patient. He stated clear understanding and  agreed to proceed. Written informed consent was obtained. On the day  of the procedure, he was met in the preop holding area. The consent was  present. Operative site marked. Questions and concerns were addressed  to his satisfaction. OPERATIVE PROCEDURE:  The patient was transported to the operating room. General anesthesia was administered by the anesthesia providers without  complication. He was transferred to a cantilever-type OR table in the  supine position. All bony prominences were well padded. He was  adequately secured to the bed. The right lower extremity was isolated,  scrubbed with Hibiclens followed by alcohol, and prepped and draped in  the usual sterile fashion. A time-out was performed that included all  involved parties in correctly identifying the patient, planned  procedure, and operative site. After everyone agreed, we continued. We  had a pre-built MAXFRAME with a 180-mm 5/8th ring connected to 180-mm  foot ring. There were four fracture struts attached to a 180-mm foot  ring. The ring was slid over the patient's leg. A transverse olive  wire was placed to the posterior aspect of the calcaneus from lateral to  medial after radiographic confirmation being placed in the safe zone. This was then attached to the foot ring after the foot was appropriately  positioned.   We then placed a medial to lateral forefoot pin to the base  of the metatarsals. This was also olive wire. We then got the  proximate location of the proximal ring block, detached the fracture  struts for more mobility of the proximal ring block. Once it was  confirmed radiographically to be perpendicular to the access of the  tibia in the lateral radiograph, we placed an anterior to posterior  5.0-mm Schanz pins off of the full ring. This was done after saline  pulled pre-drilling. We then obtained an AP image to confirm that the  ring was perpendicular to the tibial access in this view as well. We  then placed one additional transverse Schanz pin into the medial phase  of the tibia followed by an additional anterior to posterior direct pin  off the proximal aspect of the ring block, thus completing the proximal  fixation. All points of fixation were maximally tightened to secure the  proximal ring block. We then returned to the foot ring. We replaced a  posteromedial anterolateral olive wire through the calcaneus and an  additional lateral to medial olive wire through the forefoot. All wires  were appropriately tensioned as they were applied. Fracture struts were  then reapplied to the foot ring. The final strut count included two  long struts anteriorly and two medium struts posteriorly. The strut  length was left dynamic; however, all fixation points on the frame were  maximally tightened. We then performed a manual closed reduction of  this trimalleolar ankle fracture dislocation with indirect manipulation. Once we confirmed appropriate reduction on the AP, mortise, and lateral  radiographs, the struts were then fully tightened to lock the length in  place, thus holding the reduction. The ankle was showed to be placed in  the neutral dorsiflexed position. We confirmed there was no impingement  on the toes or the remainder of the soft tissues with the ring itself.    The rocker bottom attachments were then placed to the undersurface of  the foot ring. We assessed the skin at all pin sites and released as  were necessary to assure there was no tension around the half pins or  wires. We then cleansed the skin. The large hemorrhagic fracture  blister laterally was ruptured prior to arrival to the OR. Therefore,  it was deroofed and the underlying skin was appropriately healthy. The  underlying skin showed no sign of infection. The two remaining fracture  blisters medially were still covered and left intact. Hibiclens-soaked  4x4s were placed around all half pin and wire sites and overwrapped in a  Kerlix bandage in order to stabilize the pin-skin interface. Fracture  blisters were covered with nonadhesive dressings and sterile bandages. The patient was successfully extubated and transferred to the recovery  room by the anesthesia providers without complication. POSTOPERATIVE PLAN:  We will obtain digital x-rays of right ankle in the  recovery room. The patient is instructed to be nonweightbearing. Strict orders were given for ice and elevation. Beginning on day #2,  the patient may remove dressings and shower with soap and water, but  should avoid soaks. He will follow up in the Trauma Clinic in  approximately one week for a wound check and plan to return for removal  of external fixator and definitive open reduction and internal fixation  once the soft tissues have adequately recovered.         Luna Hernandez    D: 05/28/2022 23:03:02       T: 05/29/2022 6:17:29     [email or web address]  Job#: [id number]     Doc#: [id number]    CC:

## 2022-06-03 ENCOUNTER — OFFICE VISIT (OUTPATIENT)
Dept: ORTHOPEDIC SURGERY | Age: 47
End: 2022-06-03

## 2022-06-03 VITALS — BODY MASS INDEX: 34.22 KG/M2 | HEIGHT: 70 IN | WEIGHT: 239 LBS

## 2022-06-03 DIAGNOSIS — S82.851A CLOSED DISPLACED TRIMALLEOLAR FRACTURE OF RIGHT ANKLE, INITIAL ENCOUNTER: Primary | ICD-10-CM

## 2022-06-03 PROCEDURE — 99024 POSTOP FOLLOW-UP VISIT: CPT | Performed by: NURSE PRACTITIONER

## 2022-06-03 NOTE — PROGRESS NOTES
2+ DP pulse. Able to minimally flex/extend digits. Motor function limited assessment due to ex-fix frame. Deep and Superficial Peroneal/Saphenous/Sural SILT. Radiology:  No radiographs obtained today. Assessment:   55y.o. year old male with a right trimalleolar ankle fracture dislocation s/p application of circular ex-fix; DOS: 5/27/2022. Plan:   - Swelling appropriate post-op and pin sites healing without evidence of infection. Instructions and demonstration of proper pin site care provided to the patient and his mother in the clinic today. Pin sites cleansed and ace wrap applied to frame due to patient having cats at home with concerns that they will be near his leg.  - Instructed the patient to perform pin site care daily and to wash over the leg and frame with soap and water daily. Huge emphasis on keeping the pin sites clean , keeping the leg elevated and applying ice 20 mins on, 20 mins off to control swelling in order to appropriate prepare him for surgery, tentatively next week. - F/u on Tuesday for swelling check and discuss ex-fix removal     Follow up:Return in about 4 days (around 6/7/2022). Orders Placed This Encounter   Medications    Misc. Devices MISC     Sig: Wheelchair with adjustable removable foot rests     Dispense:  1 each     Refill:  0          No orders of the defined types were placed in this encounter. Electronically signed by ELEONORA Valdovinos CNP on 6/3/2022 at 11:20 AM    This note is created with the assistance of a speech recognition program.  While intending to generate a document that actually reflects the content of the visit, the document can still have some errors including those of syntax and sound a like substitutions which may escape proof reading.   In such instances, actual meaning can be extrapolated by contextual diversion

## 2022-06-07 ENCOUNTER — OFFICE VISIT (OUTPATIENT)
Dept: ORTHOPEDIC SURGERY | Age: 47
End: 2022-06-07

## 2022-06-07 VITALS — WEIGHT: 239 LBS | HEIGHT: 70 IN | BODY MASS INDEX: 34.22 KG/M2

## 2022-06-07 DIAGNOSIS — S82.851D CLOSED DISPLACED TRIMALLEOLAR FRACTURE OF RIGHT ANKLE WITH ROUTINE HEALING, SUBSEQUENT ENCOUNTER: Primary | ICD-10-CM

## 2022-06-07 PROCEDURE — 99024 POSTOP FOLLOW-UP VISIT: CPT | Performed by: NURSE PRACTITIONER

## 2022-06-09 ENCOUNTER — ANESTHESIA EVENT (OUTPATIENT)
Dept: OPERATING ROOM | Age: 47
End: 2022-06-09
Payer: MEDICAID

## 2022-06-10 ENCOUNTER — APPOINTMENT (OUTPATIENT)
Dept: GENERAL RADIOLOGY | Age: 47
End: 2022-06-10
Attending: ORTHOPAEDIC SURGERY
Payer: MEDICAID

## 2022-06-10 ENCOUNTER — HOSPITAL ENCOUNTER (OUTPATIENT)
Age: 47
Setting detail: OUTPATIENT SURGERY
Discharge: HOME | End: 2022-06-10
Attending: ORTHOPAEDIC SURGERY | Admitting: ORTHOPAEDIC SURGERY
Payer: MEDICAID

## 2022-06-10 ENCOUNTER — ANESTHESIA (OUTPATIENT)
Dept: OPERATING ROOM | Age: 47
End: 2022-06-10
Payer: MEDICAID

## 2022-06-10 VITALS
WEIGHT: 239 LBS | TEMPERATURE: 97.5 F | HEART RATE: 76 BPM | SYSTOLIC BLOOD PRESSURE: 138 MMHG | RESPIRATION RATE: 10 BRPM | DIASTOLIC BLOOD PRESSURE: 95 MMHG | BODY MASS INDEX: 36.22 KG/M2 | HEIGHT: 68 IN | OXYGEN SATURATION: 97 %

## 2022-06-10 DIAGNOSIS — G89.18 POST-OP PAIN: Primary | ICD-10-CM

## 2022-06-10 PROCEDURE — 27822 TREATMENT OF ANKLE FRACTURE: CPT | Performed by: ORTHOPAEDIC SURGERY

## 2022-06-10 PROCEDURE — 73610 X-RAY EXAM OF ANKLE: CPT

## 2022-06-10 PROCEDURE — 2500000003 HC RX 250 WO HCPCS: Performed by: ANESTHESIOLOGY

## 2022-06-10 PROCEDURE — 20694 RMVL EXT FIXJ SYS UNDER ANES: CPT | Performed by: ORTHOPAEDIC SURGERY

## 2022-06-10 PROCEDURE — 6360000002 HC RX W HCPCS

## 2022-06-10 PROCEDURE — C9290 INJ, BUPIVACAINE LIPOSOME: HCPCS | Performed by: ANESTHESIOLOGY

## 2022-06-10 PROCEDURE — 3700000001 HC ADD 15 MINUTES (ANESTHESIA): Performed by: ORTHOPAEDIC SURGERY

## 2022-06-10 PROCEDURE — 7100000010 HC PHASE II RECOVERY - FIRST 15 MIN: Performed by: ORTHOPAEDIC SURGERY

## 2022-06-10 PROCEDURE — 6360000002 HC RX W HCPCS: Performed by: ANESTHESIOLOGY

## 2022-06-10 PROCEDURE — 7100000001 HC PACU RECOVERY - ADDTL 15 MIN: Performed by: ORTHOPAEDIC SURGERY

## 2022-06-10 PROCEDURE — C1713 ANCHOR/SCREW BN/BN,TIS/BN: HCPCS | Performed by: ORTHOPAEDIC SURGERY

## 2022-06-10 PROCEDURE — 2580000003 HC RX 258: Performed by: ANESTHESIOLOGY

## 2022-06-10 PROCEDURE — 3600000004 HC SURGERY LEVEL 4 BASE: Performed by: ORTHOPAEDIC SURGERY

## 2022-06-10 PROCEDURE — 7100000000 HC PACU RECOVERY - FIRST 15 MIN: Performed by: ORTHOPAEDIC SURGERY

## 2022-06-10 PROCEDURE — 64445 NJX AA&/STRD SCIATIC NRV IMG: CPT | Performed by: ANESTHESIOLOGY

## 2022-06-10 PROCEDURE — 2720000010 HC SURG SUPPLY STERILE: Performed by: ORTHOPAEDIC SURGERY

## 2022-06-10 PROCEDURE — 2709999900 HC NON-CHARGEABLE SUPPLY: Performed by: ORTHOPAEDIC SURGERY

## 2022-06-10 PROCEDURE — 2500000003 HC RX 250 WO HCPCS

## 2022-06-10 PROCEDURE — 3600000014 HC SURGERY LEVEL 4 ADDTL 15MIN: Performed by: ORTHOPAEDIC SURGERY

## 2022-06-10 PROCEDURE — 3209999900 FLUORO FOR SURGICAL PROCEDURES

## 2022-06-10 PROCEDURE — 64447 NJX AA&/STRD FEMORAL NRV IMG: CPT | Performed by: ANESTHESIOLOGY

## 2022-06-10 PROCEDURE — C9359 IMPLNT,BON VOID FILLER-PUTTY: HCPCS | Performed by: ORTHOPAEDIC SURGERY

## 2022-06-10 PROCEDURE — 27829 TREAT LOWER LEG JOINT: CPT | Performed by: ORTHOPAEDIC SURGERY

## 2022-06-10 PROCEDURE — 2580000003 HC RX 258: Performed by: ORTHOPAEDIC SURGERY

## 2022-06-10 PROCEDURE — 3700000000 HC ANESTHESIA ATTENDED CARE: Performed by: ORTHOPAEDIC SURGERY

## 2022-06-10 DEVICE — SCREW BNE L20MM DIA2.7MM ANK S STL ST VAR ANG LOK FULL THRD: Type: IMPLANTABLE DEVICE | Site: ANKLE | Status: FUNCTIONAL

## 2022-06-10 DEVICE — PLATE BNE L118MM 6 H NONSTERILE R DST LAT FIBULAR S STL VAR: Type: IMPLANTABLE DEVICE | Site: ANKLE | Status: FUNCTIONAL

## 2022-06-10 DEVICE — SCREW BNE L50MM DIA4MM CANC SELF DRL ST CANN NONLOCKING 1/2: Type: IMPLANTABLE DEVICE | Site: ANKLE | Status: FUNCTIONAL

## 2022-06-10 DEVICE — SCREW BNE L22MM DIA2.7MM ANK S STL ST VAR ANG LOK FULL THRD: Type: IMPLANTABLE DEVICE | Site: ANKLE | Status: FUNCTIONAL

## 2022-06-10 DEVICE — GRAFT BONE CRUSH FRZ DRY CANC STRL 1-10MM 15CC: Type: IMPLANTABLE DEVICE | Site: ANKLE | Status: FUNCTIONAL

## 2022-06-10 DEVICE — SCREW CORTES 3.5MM SELF-TAPPING 18MM: Type: IMPLANTABLE DEVICE | Site: ANKLE | Status: FUNCTIONAL

## 2022-06-10 DEVICE — DBM 006001 PROGENIX PLUS 1CC
Type: IMPLANTABLE DEVICE | Site: ANKLE | Status: FUNCTIONAL
Brand: PROGENIX® PUTTY AND PROGENIX® PLUS

## 2022-06-10 DEVICE — SCREW BNE L16MM DIA2.7MM CORT S STL ST T8 STARDRV RECESS: Type: IMPLANTABLE DEVICE | Site: ANKLE | Status: FUNCTIONAL

## 2022-06-10 DEVICE — K WIRE FIX L150MM DIA1.6MM S STL TRCR PNT: Type: IMPLANTABLE DEVICE | Site: ANKLE | Status: FUNCTIONAL

## 2022-06-10 DEVICE — SCREW BNE L18MM DIA2.7MM ANK S STL ST VAR ANG LOK FULL THRD: Type: IMPLANTABLE DEVICE | Site: ANKLE | Status: FUNCTIONAL

## 2022-06-10 DEVICE — SCREW BNE L55MM DIA3.5MM CORT S STL ST NONCANNULATED LOK: Type: IMPLANTABLE DEVICE | Site: ANKLE | Status: FUNCTIONAL

## 2022-06-10 RX ORDER — MIDAZOLAM HYDROCHLORIDE 2 MG/2ML
1 INJECTION, SOLUTION INTRAMUSCULAR; INTRAVENOUS EVERY 10 MIN PRN
Status: DISCONTINUED | OUTPATIENT
Start: 2022-06-10 | End: 2022-06-10 | Stop reason: HOSPADM

## 2022-06-10 RX ORDER — LIDOCAINE HYDROCHLORIDE 10 MG/ML
1 INJECTION, SOLUTION EPIDURAL; INFILTRATION; INTRACAUDAL; PERINEURAL
Status: DISCONTINUED | OUTPATIENT
Start: 2022-06-10 | End: 2022-06-10 | Stop reason: HOSPADM

## 2022-06-10 RX ORDER — NEOSTIGMINE METHYLSULFATE 5 MG/5 ML
SYRINGE (ML) INTRAVENOUS PRN
Status: DISCONTINUED | OUTPATIENT
Start: 2022-06-10 | End: 2022-06-10 | Stop reason: SDUPTHER

## 2022-06-10 RX ORDER — GLYCOPYRROLATE 0.2 MG/ML
INJECTION INTRAMUSCULAR; INTRAVENOUS PRN
Status: DISCONTINUED | OUTPATIENT
Start: 2022-06-10 | End: 2022-06-10 | Stop reason: SDUPTHER

## 2022-06-10 RX ORDER — GABAPENTIN 100 MG/1
100 CAPSULE ORAL NIGHTLY
Qty: 30 CAPSULE | Refills: 0 | Status: SHIPPED | OUTPATIENT
Start: 2022-06-10 | End: 2022-07-10

## 2022-06-10 RX ORDER — FENTANYL CITRATE 50 UG/ML
25 INJECTION, SOLUTION INTRAMUSCULAR; INTRAVENOUS EVERY 5 MIN PRN
Status: DISCONTINUED | OUTPATIENT
Start: 2022-06-10 | End: 2022-06-10 | Stop reason: HOSPADM

## 2022-06-10 RX ORDER — BUPIVACAINE HYDROCHLORIDE 5 MG/ML
40 INJECTION, SOLUTION EPIDURAL; INTRACAUDAL ONCE
Status: COMPLETED | OUTPATIENT
Start: 2022-06-10 | End: 2022-06-10

## 2022-06-10 RX ORDER — FENTANYL CITRATE 50 UG/ML
INJECTION, SOLUTION INTRAMUSCULAR; INTRAVENOUS PRN
Status: DISCONTINUED | OUTPATIENT
Start: 2022-06-10 | End: 2022-06-10 | Stop reason: SDUPTHER

## 2022-06-10 RX ORDER — LIDOCAINE HYDROCHLORIDE 10 MG/ML
INJECTION, SOLUTION EPIDURAL; INFILTRATION; INTRACAUDAL; PERINEURAL PRN
Status: DISCONTINUED | OUTPATIENT
Start: 2022-06-10 | End: 2022-06-10 | Stop reason: SDUPTHER

## 2022-06-10 RX ORDER — ONDANSETRON 2 MG/ML
4 INJECTION INTRAMUSCULAR; INTRAVENOUS
Status: DISCONTINUED | OUTPATIENT
Start: 2022-06-10 | End: 2022-06-10 | Stop reason: HOSPADM

## 2022-06-10 RX ORDER — DOCUSATE SODIUM 100 MG/1
100 CAPSULE, LIQUID FILLED ORAL DAILY PRN
Qty: 30 CAPSULE | Refills: 0 | Status: SHIPPED | OUTPATIENT
Start: 2022-06-10

## 2022-06-10 RX ORDER — SODIUM CHLORIDE 9 MG/ML
INJECTION, SOLUTION INTRAVENOUS PRN
Status: DISCONTINUED | OUTPATIENT
Start: 2022-06-10 | End: 2022-06-10 | Stop reason: HOSPADM

## 2022-06-10 RX ORDER — BUPIVACAINE HYDROCHLORIDE 5 MG/ML
INJECTION, SOLUTION EPIDURAL; INTRACAUDAL
Status: COMPLETED | OUTPATIENT
Start: 2022-06-10 | End: 2022-06-10

## 2022-06-10 RX ORDER — SODIUM CHLORIDE 0.9 % (FLUSH) 0.9 %
5-40 SYRINGE (ML) INJECTION EVERY 12 HOURS SCHEDULED
Status: DISCONTINUED | OUTPATIENT
Start: 2022-06-10 | End: 2022-06-10 | Stop reason: HOSPADM

## 2022-06-10 RX ORDER — ONDANSETRON 2 MG/ML
INJECTION INTRAMUSCULAR; INTRAVENOUS PRN
Status: DISCONTINUED | OUTPATIENT
Start: 2022-06-10 | End: 2022-06-10 | Stop reason: SDUPTHER

## 2022-06-10 RX ORDER — MIDAZOLAM HYDROCHLORIDE 1 MG/ML
INJECTION INTRAMUSCULAR; INTRAVENOUS PRN
Status: DISCONTINUED | OUTPATIENT
Start: 2022-06-10 | End: 2022-06-10 | Stop reason: SDUPTHER

## 2022-06-10 RX ORDER — PROPOFOL 10 MG/ML
INJECTION, EMULSION INTRAVENOUS PRN
Status: DISCONTINUED | OUTPATIENT
Start: 2022-06-10 | End: 2022-06-10 | Stop reason: SDUPTHER

## 2022-06-10 RX ORDER — SODIUM CHLORIDE 0.9 % (FLUSH) 0.9 %
5-40 SYRINGE (ML) INJECTION PRN
Status: DISCONTINUED | OUTPATIENT
Start: 2022-06-10 | End: 2022-06-10 | Stop reason: HOSPADM

## 2022-06-10 RX ORDER — FENTANYL CITRATE 50 UG/ML
50 INJECTION, SOLUTION INTRAMUSCULAR; INTRAVENOUS ONCE
Status: COMPLETED | OUTPATIENT
Start: 2022-06-10 | End: 2022-06-10

## 2022-06-10 RX ORDER — MIDAZOLAM HYDROCHLORIDE 2 MG/2ML
1 INJECTION, SOLUTION INTRAMUSCULAR; INTRAVENOUS ONCE
Status: COMPLETED | OUTPATIENT
Start: 2022-06-10 | End: 2022-06-10

## 2022-06-10 RX ORDER — OXYCODONE HYDROCHLORIDE 5 MG/1
5 TABLET ORAL EVERY 6 HOURS PRN
Qty: 28 TABLET | Refills: 0 | Status: SHIPPED | OUTPATIENT
Start: 2022-06-10 | End: 2022-06-17

## 2022-06-10 RX ORDER — ROCURONIUM BROMIDE 10 MG/ML
INJECTION, SOLUTION INTRAVENOUS PRN
Status: DISCONTINUED | OUTPATIENT
Start: 2022-06-10 | End: 2022-06-10 | Stop reason: SDUPTHER

## 2022-06-10 RX ORDER — MAGNESIUM HYDROXIDE 1200 MG/15ML
LIQUID ORAL CONTINUOUS PRN
Status: COMPLETED | OUTPATIENT
Start: 2022-06-10 | End: 2022-06-10

## 2022-06-10 RX ORDER — MEPERIDINE HYDROCHLORIDE 50 MG/ML
12.5 INJECTION INTRAMUSCULAR; INTRAVENOUS; SUBCUTANEOUS EVERY 5 MIN PRN
Status: DISCONTINUED | OUTPATIENT
Start: 2022-06-10 | End: 2022-06-10 | Stop reason: HOSPADM

## 2022-06-10 RX ORDER — DEXAMETHASONE SODIUM PHOSPHATE 10 MG/ML
INJECTION INTRAMUSCULAR; INTRAVENOUS PRN
Status: DISCONTINUED | OUTPATIENT
Start: 2022-06-10 | End: 2022-06-10 | Stop reason: SDUPTHER

## 2022-06-10 RX ORDER — CYCLOBENZAPRINE HCL 10 MG
10 TABLET ORAL 3 TIMES DAILY PRN
Qty: 42 TABLET | Refills: 0 | Status: SHIPPED | OUTPATIENT
Start: 2022-06-10 | End: 2022-06-24

## 2022-06-10 RX ORDER — NAPROXEN 500 MG/1
500 TABLET ORAL 2 TIMES DAILY WITH MEALS
Qty: 60 TABLET | Refills: 0 | Status: SHIPPED | OUTPATIENT
Start: 2022-06-10

## 2022-06-10 RX ORDER — SODIUM CHLORIDE, SODIUM LACTATE, POTASSIUM CHLORIDE, CALCIUM CHLORIDE 600; 310; 30; 20 MG/100ML; MG/100ML; MG/100ML; MG/100ML
INJECTION, SOLUTION INTRAVENOUS CONTINUOUS
Status: DISCONTINUED | OUTPATIENT
Start: 2022-06-10 | End: 2022-06-10 | Stop reason: HOSPADM

## 2022-06-10 RX ADMIN — MIDAZOLAM HYDROCHLORIDE 1 MG: 1 INJECTION, SOLUTION INTRAMUSCULAR; INTRAVENOUS at 08:37

## 2022-06-10 RX ADMIN — ROCURONIUM BROMIDE 50 MG: 10 INJECTION INTRAVENOUS at 08:14

## 2022-06-10 RX ADMIN — PROPOFOL 160 MG: 10 INJECTION, EMULSION INTRAVENOUS at 08:14

## 2022-06-10 RX ADMIN — DEXAMETHASONE SODIUM PHOSPHATE 10 MG: 10 INJECTION INTRAMUSCULAR; INTRAVENOUS at 08:38

## 2022-06-10 RX ADMIN — Medication 3 MG: at 11:37

## 2022-06-10 RX ADMIN — LIDOCAINE HYDROCHLORIDE 50 MG: 10 INJECTION, SOLUTION EPIDURAL; INFILTRATION; INTRACAUDAL; PERINEURAL at 08:14

## 2022-06-10 RX ADMIN — ROCURONIUM BROMIDE 20 MG: 10 INJECTION INTRAVENOUS at 09:04

## 2022-06-10 RX ADMIN — ROCURONIUM BROMIDE 30 MG: 10 INJECTION INTRAVENOUS at 08:30

## 2022-06-10 RX ADMIN — Medication 2000 MG: at 08:26

## 2022-06-10 RX ADMIN — GLYCOPYRROLATE 0.6 MG: 0.2 INJECTION INTRAMUSCULAR; INTRAVENOUS at 11:37

## 2022-06-10 RX ADMIN — MIDAZOLAM HYDROCHLORIDE 2 MG: 1 INJECTION, SOLUTION INTRAMUSCULAR; INTRAVENOUS at 07:51

## 2022-06-10 RX ADMIN — SODIUM CHLORIDE, POTASSIUM CHLORIDE, SODIUM LACTATE AND CALCIUM CHLORIDE: 600; 310; 30; 20 INJECTION, SOLUTION INTRAVENOUS at 08:07

## 2022-06-10 RX ADMIN — FENTANYL CITRATE 25 MCG: 50 INJECTION, SOLUTION INTRAMUSCULAR; INTRAVENOUS at 10:44

## 2022-06-10 RX ADMIN — PROPOFOL 40 MG: 10 INJECTION, EMULSION INTRAVENOUS at 09:38

## 2022-06-10 RX ADMIN — FENTANYL CITRATE 50 MCG: 50 INJECTION, SOLUTION INTRAMUSCULAR; INTRAVENOUS at 08:36

## 2022-06-10 RX ADMIN — Medication 150 MG: at 08:07

## 2022-06-10 RX ADMIN — FENTANYL CITRATE 25 MCG: 50 INJECTION, SOLUTION INTRAMUSCULAR; INTRAVENOUS at 10:39

## 2022-06-10 RX ADMIN — MIDAZOLAM HYDROCHLORIDE 1 MG: 1 INJECTION, SOLUTION INTRAMUSCULAR; INTRAVENOUS at 11:55

## 2022-06-10 RX ADMIN — BUPIVACAINE 133 MG: 13.3 INJECTION, SUSPENSION, LIPOSOMAL INFILTRATION at 07:51

## 2022-06-10 RX ADMIN — BUPIVACAINE 10 ML: 13.3 INJECTION, SUSPENSION, LIPOSOMAL INFILTRATION at 07:51

## 2022-06-10 RX ADMIN — FENTANYL CITRATE 100 MCG: 50 INJECTION, SOLUTION INTRAMUSCULAR; INTRAVENOUS at 07:51

## 2022-06-10 RX ADMIN — ONDANSETRON 4 MG: 2 INJECTION INTRAMUSCULAR; INTRAVENOUS at 11:04

## 2022-06-10 RX ADMIN — MIDAZOLAM HYDROCHLORIDE 2 MG: 1 INJECTION, SOLUTION INTRAMUSCULAR; INTRAVENOUS at 09:38

## 2022-06-10 RX ADMIN — BUPIVACAINE HYDROCHLORIDE 30 ML: 5 INJECTION, SOLUTION EPIDURAL; INTRACAUDAL; PERINEURAL at 07:51

## 2022-06-10 ASSESSMENT — PAIN - FUNCTIONAL ASSESSMENT: PAIN_FUNCTIONAL_ASSESSMENT: NONE - DENIES PAIN

## 2022-06-10 NOTE — BRIEF OP NOTE
Brief Postoperative Note      Patient: Addis Chaves  YOB: 1975  MRN: [id number]    Date of Procedure: 6/10/2022    Pre-Op Diagnosis: Right rimalleolar ankle fracture s/p external fixator    Post-Op Diagnosis: Right trimalleolar ankle fracture s/p external fixator       Procedure(s):  1. Open reduction right trimalleolar ankle fracture  2. Open reduction right ankle syndesmosis  3. Right ankle ring spanning external fixator    Surgeon(s): Coral Govea DO    Assistant: Resident: Fidel Guerra DO; Joaquina Moss DO    Anesthesia: General    Estimated Blood Loss (mL): 30 mL    Tourniquet time: 224 mins    Complications: None    Specimens: * No specimens in log *    Implants:  Implant Name Type Inv.  Item Serial No.  Lot No. LRB No. Used Action   GRAFT BNE SUB 1CC DBM FOR DOMI RIDGE AUG PERIODONTAL - [id number]  GRAFT BNE SUB 1CC DBM FOR DOMI RIDGE AUG PERIODONTAL [id number] [employer] SPINALGRAFT TECHSteven [facility] N/A Right 1 Implanted   GRAFT BONE CRUSH FRZ DRY CANC STRL 1-10MM 15CC - [id number]  GRAFT BONE CRUSH FRZ DRY CANC STRL 1-10MM [address] [facility] [id number] [facility]  Right 1 Implanted   PLATE BNE [id number] 6 H NONSTERILE R DST LAT FIBULAR S STL ALLI - [id number]  PLATE BNE [id number] 6 H NONSTERILE R DST LAT FIBULAR S STL ALLI  DEPU[employer]  Right 1 Implanted   SCREW BNE L16MM DIA2.7MM AIMEE S STL ST T8 STARDRV RECESS - [id number]  SCREW BNE L16MM DIA2.7MM AIMEE S STL ST T8 STARDRV RECESS  DEPUY SYNTHES [employer]  Right 2 Implanted   SCREW BNE L18MM DIA2.7MM ANK S STL ST ALLI ANG ONDINA FULL THRD - [id number]  SCREW BNE L18MM DIA2.7MM ANK S STL ST ALLI ANG ONDINA FULL THRD  DEPUY SYNTHES [employer]  Right 1 Implanted   SCREW BNE L20MM DIA2.7MM ANK S STL ST ALLI ANG ONDINA FULL THRD - [id number]  SCREW BNE L20MM DIA2.7MM ANK S STL ST ALLI ANG ONDINA FULL THRD  [employer]  Right 2 Implanted   SILVIO 4.0MM/50MM CANCULATED SCREW      Right 1 Implanted   SCREW BNE L22MM DIA2.7MM ANK S STL ST ALLI ANG ONDINA FULL THRD - [id number]  SCREW BNE L22MM DIA2.7MM ANK S STL ST ALLI ANG ONDINA FULL THRD  DEPUY SYN[employer]  Right 2 Implanted   3.5MM CORTEX SCREW SELF-TAPPING 18MM - [id number]  3.5MM CORTEX SCREW SELF-TAPPING 18MM  DEPUY SY[employer]  Right 1 Implanted   SCREW BNE L55MM DIA3.5MM AIMEE S STL ST NONCANNULATED ONDINA - [id number]  SCREW BNE L55MM DIA3.5MM AIMEE S STL ST NONCANNULATED ONDINA  DEPUY SYNTHES [employer]  Right 1 Implanted   K WIRE FIX L150MM DIA1. 6MM S STL TRCR PNT - [id number]  K WIRE FIX L150MM DIA1. 6MM S STL TRCR PNT  DEPUY SYNTHES [place]  Right 4 Implanted         Drains: * No LDAs found *    Findings: Right trimalleolar ankle fracture    Electronically signed by Modesta Anne DO on 6/10/2022 at 11:57 AM

## 2022-06-10 NOTE — INTERVAL H&P NOTE
Pt Name: Rishabh Navarrete  MRN: [id number]  YOB: 1975  Date of evaluation: 6/10/2022    I have reviewed the patient's history and physical examination completed on 5/27/22, pre-operatively when he presented for closed reduction of his right trimalleolar ankle fracture with application of a circular external fixator. No changes to history or on examination today, unless noted below. Patient presents today for scheduled REMOVAL EX-FIX LOWER EXTREMITY, OPEN REDUCTION INTERNAL FIXATION TRIMALLEOLAR ANKLE FRACTURE- Right. Patient reports his pain is zero/10 at this time. He reports compliance with NWB.     ELEONORA Echavarria - CNP  6/10/22  7:35 AM
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
## 2022-06-10 NOTE — ANESTHESIA PROCEDURE NOTES
Peripheral Block    Patient location during procedure: pre-op  Start time: 6/10/2022 7:51 AM  End time: 6/10/2022 8:03 AM  Staffing  Performed: anesthesiologist   Anesthesiologist: Melanie Hook MD  Preanesthetic Checklist  Completed: patient identified, IV checked, site marked, risks and benefits discussed, surgical/procedural consents, equipment checked, pre-op evaluation, timeout performed, anesthesia consent given, oxygen available and monitors applied/VS acknowledged  Peripheral Block  Patient position: supine  Prep: ChloraPrep  Patient monitoring: cardiac monitor, continuous pulse ox, frequent blood pressure checks and IV access  Block type: Saphenous  Laterality: right  Injection technique: single-shot  Guidance: ultrasound guided  Local infiltration: lidocaine  Infiltration strength: 1 %  Dose: 3 mL  Provider prep: mask and sterile gloves  Local infiltration: lidocaine  Needle  Needle type: short-bevel   Needle gauge: 21 G  Needle length: 10 cm  Needle localization: ultrasound guidance  Needle insertion depth: 3 cm  Test dose: negative  Assessment  Injection assessment: negative aspiration for heme, no paresthesia on injection and local visualized surrounding nerve on ultrasound  Paresthesia pain: none  Slow fractionated injection: yes  Hemodynamics: stablepermanent images obtainedOutcomes: uncomplicated and patient tolerated procedure well  Additional Notes  U/S [id number].  (1) Under ultrasound guidance, a 21 gauge needle was inserted and placed in close proximity to the saphenous nerve.  (2) Ultrasound was also used to visualize the spread of the anesthetic in close proximity to the nerve being blocked. (3) The nerve appeared anatomically normal, and (4 there were no apparent abnormal pathological findings on the image that were readily visible and related to the nerve being blocked. (5) A permanent ultrasound image was saved in the patient's record.             Medications Administered  Bupivacaine liposome (EXPAREL) injection 1.3%, 10 mL  Reason for block: post-op pain management and at surgeon's request

## 2022-06-10 NOTE — ANESTHESIA PROCEDURE NOTES
Peripheral Block    Patient location during procedure: pre-op  Start time: 6/10/2022 7:51 AM  End time: 6/10/2022 8:03 AM  Staffing  Performed: anesthesiologist   Anesthesiologist: Arturo Larios MD  Preanesthetic Checklist  Completed: patient identified, IV checked, site marked, risks and benefits discussed, surgical/procedural consents, equipment checked, pre-op evaluation, timeout performed, anesthesia consent given, oxygen available and monitors applied/VS acknowledged  Peripheral Block  Prep: ChloraPrep  Patient monitoring: cardiac monitor, continuous pulse ox, frequent blood pressure checks and IV access  Block type: Sciatic  Laterality: right  Injection technique: single-shot  Guidance: ultrasound guided  Local infiltration: lidocaine  Infiltration strength: 1 %  Dose: 3 mL  Popliteal  Provider prep: mask and sterile gloves  Local infiltration: lidocaine  Needle  Needle type: short-bevel   Needle gauge: 21 G  Needle length: 10 cm  Needle localization: ultrasound guidance  Needle insertion depth: 3 cm  Test dose: negative  Assessment  Injection assessment: negative aspiration for heme, no paresthesia on injection and local visualized surrounding nerve on ultrasound  Paresthesia pain: none  Slow fractionated injection: yes  Hemodynamics: stablepermanent images obtainedOutcomes: uncomplicated and patient tolerated procedure well  Additional Notes  U/S [id number].  (1) Under ultrasound guidance, a 21 gauge needle was inserted and placed in close proximity to the popliteal nerve.  (2) Ultrasound was also used to visualize the spread of the anesthetic in close proximity to the nerve being blocked. (3) The nerve appeared anatomically normal, and (4 there were no apparent abnormal pathological findings on the image that were readily visible and related to the nerve being blocked. (5) A permanent ultrasound image was saved in the patient's record.             Medications Administered  Bupivacaine (MARCAINE) PF injection 0.5%, 30 mL  Reason for block: post-op pain management and at surgeon's request

## 2022-06-10 NOTE — ANESTHESIA POSTPROCEDURE EVALUATION
Department of Anesthesiology  Postprocedure Note    Patient: Henny Manzanares  MRN: [id number]  YOB: 1975  Date of evaluation: 6/10/2022  Time:  1:51 PM     Procedure Summary     Date: 06/10/22 Room / Location: [address]    Anesthesia Start: 0284 Anesthesia Stop: 7938    Procedure: ORIF TRIMALLEOLAR FRACTURE WITH EX FIX REMOVAL, ORIF SYNDESMOSIS, SYNTHES, C-ARM (Right ) Diagnosis:       Closed trimalleolar fracture of right ankle, initial encounter      (TRIMALLEOLAR FRACTURE)    Surgeons: Timmy Arango DO Responsible Provider: Irene Arteaga MD    Anesthesia Type: general, regional ASA Status: 2          Anesthesia Type: No value filed. Samson Phase I: Samson Score: 10    Samson Phase II: Samson Score: 10    Last vitals: Reviewed and per EMR flowsheets.        Anesthesia Post Evaluation    Patient location during evaluation: PACU  Patient participation: complete - patient participated  Level of consciousness: awake  Pain score: 1  Airway patency: patent  Nausea & Vomiting: no nausea and no vomiting  Complications: no  Cardiovascular status: blood pressure returned to baseline and hemodynamically stable  Respiratory status: acceptable  Hydration status: euvolemic

## 2022-06-11 NOTE — OP NOTE
[address]                  [facility] [address]                                OPERATIVE REPORT    PATIENT NAME: Yaya Chandra                          :        1975  MED REC NO:   [id number]                             ROOM:  ACCOUNT NO:   [de-identified]                           ADMIT DATE: 06/10/2022  PROVIDER:     Liv Angel    DATE OF PROCEDURE:  06/10/2022    PREOPERATIVE DIAGNOSES:  1. Right trimalleolar ankle fracture dislocation, status post external  fixator. POSTOPERATIVE DIAGNOSIS:  1. Right trimalleolar ankle fracture dislocation with syndesmotic  instability status post external fixator application    PROCEDURES PERFORMED:  1. Removal of external fixator from right ankle. 2.  Open reduction and internal fixation of right trimalleolar ankle  fracture. 3.  Open treatment of right syndesmosis. ATTENDING SURGEON:  Liv Angel DO    ASSISTANTS:  1.  Alejandra Simms, PGY-4  2. Estefania Pascual, PGY-1    ANESTHESIA:  General.    ESTIMATED BLOOD LOSS:  30 mL    IV FLUIDS:  See anesthesia records. TOURNIQUET TIME:  120 minutes. COMPLICATIONS:  None. SPECIMENS:  None. IMPLANTS:  Synthes, 6-hole locking lateral distal fibula plate and  6.2-KP cannulated screw. INDICATIONS:  The patient is a 42-year-old male who sustained a right  trimalleolar ankle fracture dislocation. He was noncompliant with his  splint and nonweightbearing instructions and was found to be dislocated  in the office with significant soft tissue swelling and blistering. He  underwent application of a ankle-spanning external fixator construct and  has been followed in the office for swelling checks. Earlier this week  he was evaluated, blisters are healed appropriately, and soft tissue  swelling anticipated. We discussed the details of open reduction and  internal fixation and removal of external fixator.   The patient stated  clear understanding, distal  aspect of the plate. AP mortise and lateral radiographs showed  appropriate length, line, and rotation. The fracture gap was noted due  to bony impaction and was filled with a combination of 1 mL of DBM and  crushed cancellous allograft. We then turned our attention to the  medial malleolus. An incision was made. Fracture site was clean. The  medial malleolus fragment was fairly small therefore, we determined only  one screw would be available to place. We reduced this with a dental  pick and placed a wire for provisional fixation. Fluoroscopic images  showed appropriate reduction and wire placement. Therefore, we  measured, drilled, and inserted a 4.0-mm partially threaded cannulated  screw over the wire. The wire was then removed. AP, mortise, and  lateral radiographs showed acceptable reduction and safe and appropriate  implant placement in all views. We then performed a live external  rotation stress exam under fluoroscopy, which showed syndesmotic  instability. Therefore, we placed the syndesmotic screw through the  fibular plate. Once the syndesmotic screw was fully tightened, the static and  dynamic images were repeated and showed increased stability with  appropriate alignment in all views. Satisfied with the current  fixation, copiously irrigated the wounds, closed in a standard fashion  with absorbable suture for the deep layers and 3-0 nylon in vertical  mattress pattern for the skin layers. Skin was cleaned and dried. Tourniquet was deflated. Dressing was applied as was a well-padded  short-leg splint. The patient was successfully extubated and  transferred to the recovery room by the anesthesia providers without  complication. POSTOPERATIVE PLAN:  I will obtain digital x-rays of the right ankle in  the recovery room.   The patient will be discharged home with pain  medications for multimodal pain protocol; did not require outpatient  antibiotics or DVT prophylaxis for this isolated lower extremity injury. He will be nonweightbearing for 6 weeks from the date of this procedure. He will follow up in the [place] [id number] in approximately 10 to  14 days from the date of this procedure for a wound check and anticipate  suture removal at that time.         Genet Granda    D: 06/10/2022 18:08:14       T: 06/10/2022 18:14:31     [email or web address]  Job#: [id number]     Doc#: [id number]    CC:

## 2022-06-24 ENCOUNTER — OFFICE VISIT (OUTPATIENT)
Dept: ORTHOPEDIC SURGERY | Age: 47
End: 2022-06-24

## 2022-06-24 VITALS — WEIGHT: 239 LBS | HEIGHT: 68 IN | BODY MASS INDEX: 36.22 KG/M2

## 2022-06-24 DIAGNOSIS — S82.851D CLOSED DISPLACED TRIMALLEOLAR FRACTURE OF RIGHT ANKLE WITH ROUTINE HEALING, SUBSEQUENT ENCOUNTER: Primary | ICD-10-CM

## 2022-06-24 PROCEDURE — 99024 POSTOP FOLLOW-UP VISIT: CPT | Performed by: NURSE PRACTITIONER

## 2022-06-24 NOTE — PATIENT INSTRUCTIONS
Incision care: Wash incisions daily with soap and water but do not soak incisions in bath tub/hot tub/swimming pool. Leave steri strips in place until they naturally fall off. Keep incisions clean and dry. Weightbearing restrictions: Remain non-weightbearing to the right lower extremity. Remain in the CAM boot at all times except for removal for hygiene and gentle range of motion of the ankle.

## 2022-06-24 NOTE — PROGRESS NOTES
[facility]  [id number] [facility]  Dept Phone: [phone]  Dept Fax: [phone]      [facility] Follow Up      Subjective:   Date of Surgery:     6/10/2022: Removal of ex-fix, ORIF right trimalleolar ankle fracture     5/27/2022: closed reduction right trimalleolar ankle fracture dislocation with ex-fix application    Simona Vora is a 55y.o. year old male who presents to the clinic today for routine follow up 15 days post operatively from removal of external fixator and open reduction internal fixation of his right trimalleolar ankle fracture. Patient states he has been compliant with nonweightbearing to the right lower extremity with use of a wheelchair. States the pain is very minimal to none. States he has been working on wiggling his toes and trying to move the ankle in the splint. Denies any numbness or tingling. Denies any new injuries or falls. Review of Systems  Gen: no fever, chills, malaise  CV: no chest pain or palpitations  Resp: no cough or shortness of breath  GI: no nausea, vomiting, diarrhea, or constipation  Neuro: no seizures, vertigo, or headache  Msk: no right ankle pain   10 remaining systems reviewed and negative    Objective : There were no vitals filed for this visit. Body mass index is 36.34 kg/m². General: No acute distress, resting comfortably in the clinic  Neuro: alert. oriented  Eyes: Extra-ocular muscles intact  Pulm: Respirations unlabored and regular. Skin: warm, well perfused  Psych:   Patient has good fund of knowledge and displays understanding of exam, diagnosis, and plan. RLE:  Skin intact. Incisions healing without evidence of dehiscence or infection. Swelling about the foot and ankle appropriate post operatively. Able to minimally dorsiflex and plantar flex the ankle. Compartments soft. 2+ DP pulse. TA/EHL/FHL/GS motor intact. Deep and Superficial Peroneal/Saphenous/Sural SILT.         Radiology:  No radiographs obtained today. Assessment:   55y.o. year old male with a right trimalleolar ankle fracture dislocation s/p ex fix 5/27, s/p ORIF 6/10/2022. Plan:   - Splint removed. Sutures removed, steri strips applied. Wash incisions daily with soap and water but do not soak incisions in bath tub/hot tub/swimming pool. Keep incisions clean and dry. - Remain non-weightbearing to the right lower extremity. Remain in the CAM boot at all times except for removal for hygiene and gentle range of motion of the ankle. - F/u in 4 weeks with x-rays out of the boot    Follow up:Return in about 4 weeks (around 7/22/2022) for x-rays out of cast/splint/brace. No orders of the defined types were placed in this encounter. No orders of the defined types were placed in this encounter. Electronically signed by ELEONORA Perales CNP on 6/24/2022 at 8:55 AM    This note is created with the assistance of a speech recognition program.  While intending to generate a document that actually reflects the content of the visit, the document can still have some errors including those of syntax and sound a like substitutions which may escape proof reading.   In such instances, actual meaning can be extrapolated by contextual diversion

## 2022-07-11 ENCOUNTER — HOSPITAL ENCOUNTER (EMERGENCY)
Age: 47
Discharge: HOME | End: 2022-07-11
Attending: EMERGENCY MEDICINE
Payer: MEDICAID

## 2022-07-11 VITALS
HEART RATE: 95 BPM | SYSTOLIC BLOOD PRESSURE: 152 MMHG | WEIGHT: 230 LBS | TEMPERATURE: 98.9 F | RESPIRATION RATE: 18 BRPM | BODY MASS INDEX: 34.97 KG/M2 | DIASTOLIC BLOOD PRESSURE: 96 MMHG | OXYGEN SATURATION: 96 %

## 2022-07-11 DIAGNOSIS — S01.312A COMPLEX LACERATION OF LEFT EAR, INITIAL ENCOUNTER: Primary | ICD-10-CM

## 2022-07-11 PROCEDURE — 6360000002 HC RX W HCPCS

## 2022-07-11 PROCEDURE — 90471 IMMUNIZATION ADMIN: CPT

## 2022-07-11 PROCEDURE — 2500000003 HC RX 250 WO HCPCS

## 2022-07-11 PROCEDURE — 90715 TDAP VACCINE 7 YRS/> IM: CPT

## 2022-07-11 PROCEDURE — 99284 EMERGENCY DEPT VISIT MOD MDM: CPT

## 2022-07-11 PROCEDURE — 6370000000 HC RX 637 (ALT 250 FOR IP)

## 2022-07-11 PROCEDURE — 12011 RPR F/E/E/N/L/M 2.5 CM/<: CPT

## 2022-07-11 RX ORDER — LIDOCAINE HYDROCHLORIDE 10 MG/ML
5 INJECTION, SOLUTION INFILTRATION; PERINEURAL ONCE
Status: COMPLETED | OUTPATIENT
Start: 2022-07-11 | End: 2022-07-11

## 2022-07-11 RX ORDER — IBUPROFEN 800 MG/1
800 TABLET ORAL ONCE
Status: COMPLETED | OUTPATIENT
Start: 2022-07-11 | End: 2022-07-11

## 2022-07-11 RX ORDER — AMOXICILLIN AND CLAVULANATE POTASSIUM 875; 125 MG/1; MG/1
1 TABLET, FILM COATED ORAL 2 TIMES DAILY
Qty: 20 TABLET | Refills: 0 | Status: SHIPPED | OUTPATIENT
Start: 2022-07-11 | End: 2022-07-21

## 2022-07-11 RX ORDER — AMOXICILLIN AND CLAVULANATE POTASSIUM 875; 125 MG/1; MG/1
1 TABLET, FILM COATED ORAL
Status: COMPLETED | OUTPATIENT
Start: 2022-07-11 | End: 2022-07-11

## 2022-07-11 RX ADMIN — AMOXICILLIN AND CLAVULANATE POTASSIUM 1 TABLET: 875; 125 TABLET, FILM COATED ORAL at 17:27

## 2022-07-11 RX ADMIN — TETANUS TOXOID, REDUCED DIPHTHERIA TOXOID AND ACELLULAR PERTUSSIS VACCINE, ADSORBED 0.5 ML: 5; 2.5; 8; 8; 2.5 SUSPENSION INTRAMUSCULAR at 17:28

## 2022-07-11 RX ADMIN — LIDOCAINE HYDROCHLORIDE 5 ML: 10 INJECTION, SOLUTION INFILTRATION; PERINEURAL at 17:15

## 2022-07-11 RX ADMIN — IBUPROFEN 800 MG: 800 TABLET, FILM COATED ORAL at 16:01

## 2022-07-11 ASSESSMENT — PAIN DESCRIPTION - LOCATION: LOCATION: EAR

## 2022-07-11 ASSESSMENT — ENCOUNTER SYMPTOMS
SORE THROAT: 0
ABDOMINAL PAIN: 0
FACIAL SWELLING: 0
TROUBLE SWALLOWING: 0
SINUS PAIN: 0
SHORTNESS OF BREATH: 0

## 2022-07-11 ASSESSMENT — PAIN SCALES - GENERAL: PAINLEVEL_OUTOF10: 10

## 2022-07-11 ASSESSMENT — PAIN - FUNCTIONAL ASSESSMENT: PAIN_FUNCTIONAL_ASSESSMENT: 0-10
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
## 2022-07-13 DIAGNOSIS — S82.851D CLOSED DISPLACED TRIMALLEOLAR FRACTURE OF RIGHT ANKLE WITH ROUTINE HEALING, SUBSEQUENT ENCOUNTER: Primary | ICD-10-CM

## 2022-07-14 ENCOUNTER — OFFICE VISIT (OUTPATIENT)
Dept: ORTHOPEDIC SURGERY | Age: 47
End: 2022-07-14

## 2022-07-14 VITALS — HEIGHT: 68 IN | BODY MASS INDEX: 34.86 KG/M2 | WEIGHT: 230 LBS

## 2022-07-14 DIAGNOSIS — G89.18 POST-OP PAIN: Primary | ICD-10-CM

## 2022-07-14 DIAGNOSIS — S82.851D CLOSED DISPLACED TRIMALLEOLAR FRACTURE OF RIGHT ANKLE WITH ROUTINE HEALING, SUBSEQUENT ENCOUNTER: ICD-10-CM

## 2022-07-14 PROCEDURE — 99024 POSTOP FOLLOW-UP VISIT: CPT | Performed by: ORTHOPAEDIC SURGERY

## 2022-07-14 RX ORDER — HYDROCODONE BITARTRATE AND ACETAMINOPHEN 5; 325 MG/1; MG/1
1 TABLET ORAL EVERY 4 HOURS PRN
Qty: 10 TABLET | Refills: 0 | Status: SHIPPED | OUTPATIENT
Start: 2022-07-14 | End: 2022-07-17

## 2022-07-14 RX ORDER — HYDROCODONE BITARTRATE AND ACETAMINOPHEN 5; 325 MG/1; MG/1
1 TABLET ORAL EVERY 4 HOURS PRN
Qty: 18 TABLET | Refills: 0 | Status: SHIPPED | OUTPATIENT
Start: 2022-07-14 | End: 2022-07-14
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
## 2022-07-14 NOTE — PROGRESS NOTES
[address] [facility]  [address] [place] [id number]  Dept: [phone]  Dept Fax: [phone]        Orthopaedic Clinic Follow Up      Subjective:   Date of Surgery:     6/10/2022: Removal of ex-fix, ORIF right trimalleolar ankle fracture      5/27/2022: closed reduction right trimalleolar ankle fracture dislocation with ex-fix application    Antoni Schulz is a 55 y.o. male who presents to the clinic today for routine follow up 5 weeks post op from ex fix removal and ORIF of a right trimalleolar ankle fracture. Patient states he has been NWB, but walks in to clinic today with a cane and partial weight bearing. Keeps CAM boot on most of the time, except to sleep in. Presents with it today in fair repair. Is using aspirin/ibuprofen occasionally for pain. No numbness or tingling to the RLE. Has been working on ROM at home. Review of Systems  Gen: no fever, chills, malaise  CV: no chest pain or palpitations  Resp: no cough or shortness of breath  GI: no nausea, vomiting, diarrhea, or constipation  Neuro: no seizures, vertigo, or headache  Msk: See HPI. 10 remaining systems reviewed and negative    Objective : There were no vitals filed for this visit. Body mass index is 34.97 kg/m². General: No acute distress, resting comfortably in the clinic  Neuro: alert. oriented  Eyes: Extra-ocular muscles intact  Pulm: Respirations unlabored and regular. Skin: warm, well perfused  Psych:   Patient has good fund of knowledge and displays understanding of exam, diagnosis, and plan. MSK:    Right lower extremity: Steri strips removed. Incisions clean, dry and intact. No concerns for infection. Nontender to palpation along length of fibula, posterior mal, and medial mal. 15 deg dorsiflexion, 30 deg plantarflexion actively. Supination/pronation of the foot limited to nearly 0 deg of motion. EHL/FHL/TA/GS complex motor intact.  Sural, saphenous, superificial/deep peroneal, and plantar nerve distribution SILT. Dorsalis pedis pulse 2+ with BCR. Radiology:  History: Right trimalleolar ankle fracture    Comparison: 6/10/22    Findings: 3 views of the right ankle (AP, lateral, oblique) in a skeletally mature patient showing evidence of previous trimalleolar fracture s/p ORIF. Patient appears to have great interval callus formation about the fibula and posterior malleolus segment. Some osteolysis appreciated at previously anatomically reduced medial malleolus fracture. Otherwise no signs of hardware failure or complications    Impression: Right trimalleolar ankle fracture s/p ORIF with routine interval healing, although some osteolysis of medial malleolus is appreciated. Assessment:   55y.o. year old male being seen for:      ICD-10-CM    1. Post-op pain  G89.18 HYDROcodone-acetaminophen (NORCO) 5-325 MG per tablet     DISCONTINUED: HYDROcodone-acetaminophen (NORCO) 5-325 MG per tablet   2. Closed displaced trimalleolar fracture of right ankle with routine healing, subsequent encounter  S82.851D        Plan:   -Patient cleared of weight bearing restrictions, although advised to take it slowly and begin in CAM walker boot. -Exercises provided to facilitate ankle stability  -Osteolysis about medial mal likely secondary to noncompliance with nonweightbearing status, although insignificant clinically. Follow up:Return in about 6 weeks (around 8/25/2022). Orders Placed This Encounter   Medications    DISCONTD: HYDROcodone-acetaminophen (NORCO) 5-325 MG per tablet     Sig: Take 1 tablet by mouth every 4 hours as needed for Pain for up to 3 days. Intended supply: 3 days. Take lowest dose possible to manage pain     Dispense:  18 tablet     Refill:  0     Reduce doses taken as pain becomes manageable    HYDROcodone-acetaminophen (NORCO) 5-325 MG per tablet     Sig: Take 1 tablet by mouth every 4 hours as needed for Pain for up to 3 days.  Intended supply: 3 days. Take lowest dose possible to manage pain     Dispense:  10 tablet     Refill:  0     Reduce doses taken as pain becomes manageable       No orders of the defined types were placed in this encounter. Electronically signed by Josiah Alvarez MD PGY-3 on 7/14/2022 at 11:07 AM    This note is created with the assistance of a speech recognition program.  While intending to generate a document that actually reflects the content of the visit, the document can still have some errors including those of syntax and sound a like substitutions which may escape proof reading.   In such instances, actual meaning can be extrapolated by contextual diversion

## 2022-07-14 NOTE — PATIENT INSTRUCTIONS
Patient Education        Ankle Fracture: Rehab Exercises  Introduction  Here are some examples of exercises for you to try. The exercises may be suggested for a condition or for rehabilitation. Start each exercise slowly. Ease off the exercises if you start to have pain. You will be told when to start these exercises and which ones will work bestfor you. How to do the exercises  Calf stretch (knee straight)    For this exercise, you will need a towel. 1. Sit with your affected leg straight and supported on the floor. Your other leg should be bent, with that foot flat on the floor. 2. Place a towel around your affected foot just under the toes. 3. Hold one end of the towel in each hand, with your hands above your knees. 4. Pull back gently with the towel so that your foot stretches toward you. 5. Hold the position for at least 15 to 30 seconds. 6. Repeat 2 to 4 times a session, up to 5 sessions a day. Calf stretch (knee bent)    For this exercise, you will need a towel. You will also need a pillow or foamroll. 1. Sit with your affected leg straight and supported on the floor. Your other leg should be bent, with that foot flat on the floor. 2. Place a pillow or foam roll under your affected leg. 3. Place a towel around your affected foot just under the toes. 4. Hold one end of the towel in each hand, with your hands above your knees. 5. Pull back gently with the towel so that your foot stretches toward you. 6. Hold the position for at least 15 to 30 seconds. 7. Repeat 2 to 4 times a session, up to 5 sessions a day. Ankle plantar flexion    1. Sit with your affected leg straight and supported on the floor. Your other leg should be bent, with that foot flat on the floor. 2. Keeping your affected leg straight, gently flex your foot downward so your toes are pointed away from your body. Then slowly relax your foot to the starting position. 3. Repeat 8 to 12 times. Ankle dorsiflexion    1.  Sit with your affected leg straight and supported on the floor. Your other leg should be bent, with that foot flat on the floor. 2. Keeping your affected leg straight, gently flex your foot back toward your body so your toes point upward. Then slowly relax your foot to the starting position. 3. Repeat 8 to 12 times. Resisted ankle plantar flexion    For the next four exercises, you will need elastic exercise material, such assurgical tubing or Thera-Band. 1. Sit with your affected leg straight and supported on the floor. Your other leg should be bent, with that foot flat on the floor. 2. Place an elastic band around your affected foot just under the toes. 3. Hold each end of the band in each hand, with your hands above your knees. 4. Keeping your affected leg straight, gently flex your foot downward so your toes are pointed away from your body. Then slowly relax your foot to the starting position. 5. Repeat 8 to 12 times. Resisted ankle dorsiflexion    1. Tie the ends of an exercise band together to form a loop. Attach one end of the loop to a secure object, like a table leg, or shut a door on it to hold it in place. (Or you can have someone hold one end of the loop to provide resistance.)  2. While sitting on the floor or in a chair, loop the other end of the band over the top of your affected foot. 3. Keeping your knee and leg straight, slowly flex your foot toward you to pull back on the exercise band, and then slowly relax. 4. Repeat 8 to 12 times. Resisted ankle inversion    1. Sit on the floor with your good leg crossed over your other leg. 2. Hold both ends of an exercise band and loop the band around the inside of your affected foot. Then press your good foot against the band. 3. Keeping your legs crossed, slowly push your affected foot against the band so that foot moves away from your good foot. Then slowly relax. 4. Repeat 8 to 12 times. Resisted ankle eversion    1.  Sit on the floor with your legs straight. 2. Hold both ends of an exercise band and loop the band around the outside of your affected foot. Then press your good foot against the band. 3. Keeping your leg straight, slowly push your affected foot outward against the band and away from your good foot without letting your leg rotate. Then slowly relax. 4. Repeat 8 to 12 times. Ankle alphabet    1. Sit in a chair with your feet flat on the floor. (You can also do this exercise lying on your back with your affected leg propped up on a pillow). 2. Lift the heel of your affected foot off the floor, and slowly trace the letters of the alphabet. Heel raises    1. Stand with your feet a few inches apart, with your hands lightly resting on a counter or chair in front of you. 2. Slowly raise your heels off the floor while keeping your knees straight. 3. Hold for about 6 seconds, then slowly lower your heels to the floor. 4. Do 8 to 12 repetitions several times during the day. Follow-up care is a key part of your treatment and safety. Be sure to make and go to all appointments, and call your doctor if you are having problems. It's also a good idea to know your test results and keep alist of the medicines you take. Where can you learn more? Go to https://[employer]. org and sign in to your [employer] account. Enter T800 in the [facility] box to learn more about \"Ankle Fracture: Rehab Exercises. \"     If you do not have an account, please click on the \"Sign Up Now\" link. Current as of: March 9, 2022               Content Version: 13.3  © 6455-6752 [employer]. Care instructions adapted under license by [facility] ([facility]). If you have questions about a medical condition or this instruction, always ask your healthcare professional. [address] any warranty or liability for your use of this information.

## 2022-07-17 ENCOUNTER — HOSPITAL ENCOUNTER (EMERGENCY)
Age: 47
Discharge: HOME | End: 2022-07-17
Attending: EMERGENCY MEDICINE
Payer: MEDICAID

## 2022-07-17 VITALS
DIASTOLIC BLOOD PRESSURE: 92 MMHG | OXYGEN SATURATION: 99 % | RESPIRATION RATE: 20 BRPM | SYSTOLIC BLOOD PRESSURE: 134 MMHG | HEART RATE: 83 BPM | TEMPERATURE: 98.3 F

## 2022-07-17 DIAGNOSIS — S01.312D: Primary | ICD-10-CM

## 2022-07-17 PROCEDURE — 99282 EMERGENCY DEPT VISIT SF MDM: CPT

## 2022-07-17 ASSESSMENT — ENCOUNTER SYMPTOMS
TROUBLE SWALLOWING: 0
COLOR CHANGE: 0
COUGH: 0
SHORTNESS OF BREATH: 0
DIARRHEA: 0
CHEST TIGHTNESS: 0
VOMITING: 0
BACK PAIN: 0
ABDOMINAL PAIN: 0
NAUSEA: 0

## 2022-07-17 ASSESSMENT — PAIN - FUNCTIONAL ASSESSMENT: PAIN_FUNCTIONAL_ASSESSMENT: NONE - DENIES PAIN

## 2022-07-17 NOTE — ED PROVIDER NOTES
[facility]     Emergency Department     Faculty Attestation    I performed a history and physical examination of the patient and discussed management with the resident. I reviewed the residents note and agree with the documented findings including all diagnostic interpretations and plan of care. Any areas of disagreement are noted on the chart. I was personally present for the key portions of any procedures. I have documented in the chart those procedures where I was not present during the key portions. I have reviewed the emergency nurses triage note. I agree with the chief complaint, past medical history, past surgical history, allergies, medications, social and family history as documented unless otherwise noted below. Documentation of the HPI, Physical Exam and Medical Decision Making performed by scribjaspreet is based on my personal performance of the HPI, PE and MDM. For Physician Assistant/ Nurse Practitioner cases/documentation I have personally evaluated this patient and have completed at least one if not all key elements of the E/M (history, physical exam, and MDM). Additional findings are as noted. This patient was evaluated in the Emergency Department for symptoms described in the history of present illness. He/she was evaluated in the context of the global COVID-19 pandemic, which necessitated consideration that the patient might be at risk for infection with the SARS-CoV-2 virus that causes COVID-19. Institutional protocols and algorithms that pertain to the evaluation of patients at risk for COVID-19 are in a state of rapid change based on information released by regulatory bodies including the CDC and federal and state organizations. These policies and algorithms were followed during the patient's care in the ED. Primary Care Physician: No primary care provider on file. History:  This is a 55 y.o. male who presents to the Emergency Department with complaint of suture removal.  Patient had sutures placed in the ER on the 11th in the emergency department. Has completed antibiotics as prescribed. No drainage    Physical:     temperature is 98.3 °F (36.8 °C). His blood pressure is 134/92 (abnormal) and his pulse is 83. His respiration is 20 and oxygen saturation is 99%. 55 y.o. male no acute distress, multiple sutures noted in the antihelix on the posterior aspect of the ear. There does appear evidence of a cauliflower ear forming with no clear hematoma amenable to drainage at this time.   No signs of infection    Impression: Suture removal    Plan: Remove sutures    Kaleen Koyanagi, MD, [facility]  Attending Emergency Physician         Randy Cohen MD  07/17/22 [id number]

## 2022-07-17 NOTE — DISCHARGE INSTRUCTIONS
You have been seen in the ER today for your laceration. 5 sutures were removed from the posterior aspect of your left ear. You have been given antibiotics at your previous visit, please finish this course of antibiotics. Please follow-up with your primary care provider with in 1 week for reevaluation of your wound. If you begin to experience any symptoms such as chest pain shortness of breath nausea vomiting dizziness drowsiness abdominal pain loss of consciousness or any other symptoms you find concerning please return to the ED for follow-up evaluation. If you have been given medication please take them as prescribed for the pain. Do not take more medication than recommended at any given time. Please follow-up with your primary care provider within 5 to 7 days for continued care.
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
## 2022-08-24 DIAGNOSIS — S82.851D CLOSED DISPLACED TRIMALLEOLAR FRACTURE OF RIGHT ANKLE WITH ROUTINE HEALING, SUBSEQUENT ENCOUNTER: Primary | ICD-10-CM

## 2022-08-25 ENCOUNTER — OFFICE VISIT (OUTPATIENT)
Dept: ORTHOPEDIC SURGERY | Age: 47
End: 2022-08-25

## 2022-08-25 VITALS — BODY MASS INDEX: 34.86 KG/M2 | WEIGHT: 230 LBS | HEIGHT: 68 IN

## 2022-08-25 DIAGNOSIS — S82.851D CLOSED DISPLACED TRIMALLEOLAR FRACTURE OF RIGHT ANKLE WITH ROUTINE HEALING, SUBSEQUENT ENCOUNTER: Primary | ICD-10-CM

## 2022-08-25 PROCEDURE — 99024 POSTOP FOLLOW-UP VISIT: CPT | Performed by: ORTHOPAEDIC SURGERY

## 2022-08-25 NOTE — PROGRESS NOTES
[facility]  [address] [facility] [address] [facility]  Dept Phone: [phone]  Dept Fax: [phone]      Orth[facility]uma Clinic Follow Up      Subjective:   Date of Surgery:   6/10/2022: Removal of ex-fix, ORIF right trimalleolar ankle fracture      5/27/2022: closed reduction right trimalleolar ankle fracture dislocation with ex-fix application    Glynn Toth is a 55y.o. year old male who presents to the clinic today for routine follow up 3 mo s/p ORIF R trimal. He states his ankle is doing well. 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 Denies pain in the ankle. Reports some discomfort around the toes or hindfoot occasionally. Requesting narcotics. Review of Systems  Gen: no fever, chills, malaise  CV: no chest pain or palpitations  Resp: no cough or shortness of breath  GI: no nausea, vomiting, diarrhea, or constipation  Neuro: no seizures, vertigo, or headache  Msk: R toe pain intermittently   10 remaining systems reviewed and negative    Objective : There were no vitals filed for this visit. Body mass index is 34.97 kg/m². General: No acute distress, resting comfortably in the clinic  Neuro: alert. oriented  Eyes: Extra-ocular muscles intact  Pulm: Respirations unlabored and regular. Skin: warm, well perfused  Psych:   Patient has good fund of knowledge and displays understanding of exam, diagnosis, and plan. MSK:  RLE: Incisions about the ankle well healed. No TTP about the ankle. Eversion weakness. No TTP or limitations in the foot/toes. compartments soft. 2+ DP/PT pulses with BCR. TA/EHL/FHL/GS motor intact. Saph/Carlos/DP/SP nerves SILT. Radiology:  History: R trimal ankle fx s/p ORIF    Comparison: XR R ankle 7/13/22    Findings: 3 views of the R ankle (AP, Mortise, and Lateral) in a skeletally mature patient showing union of fibula. Medial mal shows slow progression but no change in alignment. All implants remain well fixed.  Syndesmosis alignment unchanged    Impression: R trimalleolar ankle fx s/p ORIF    Assessment:   55y.o. year old male with R trimal s/p ORIF  Plan:   Still refusing to accept free exercise bands and printed instructions for HEP. Denied request for narcotics. Encouraged continued activity. F/U PRN    Follow up:Return if symptoms worsen or fail to improve. No orders of the defined types were placed in this encounter. No orders of the defined types were placed in this encounter. Electronically signed by Margarita Ruiz DO on 8/25/2022 at 10:18 AM    This note is created with the assistance of a speech recognition program.  While intending to generate a document that actually reflects the content of the visit, the document can still have some errors including those of syntax and sound a like substitutions which may escape proof reading.   In such instances, actual meaning can be extrapolated by contextual diversion

## 2022-09-20 RX ORDER — DIVALPROEX SODIUM 250 MG/1
TABLET, DELAYED RELEASE ORAL
Qty: 180 TABLET | Refills: 2 | Status: SHIPPED | OUTPATIENT
Start: 2022-09-20

## 2022-09-20 NOTE — TELEPHONE ENCOUNTER
E-scribe requesting refill for divalproex (DEPAKOTE) 250 MG. Please review and e-scribe if applicable. Last Visit Date: 04/28/22    Next Visit Date:  No future appointments.

## 2022-09-22 ENCOUNTER — HOSPITAL ENCOUNTER (OUTPATIENT)
Age: 47
Discharge: HOME | End: 2022-09-22
Payer: MEDICAID

## 2022-09-22 LAB
PHENYTOIN LEVEL: 4.7 UG/ML (ref 10–20)
VALPROIC ACID LEVEL: 89 UG/ML (ref 50–125)

## 2022-09-22 PROCEDURE — 80164 ASSAY DIPROPYLACETIC ACD TOT: CPT

## 2022-09-22 PROCEDURE — 80185 ASSAY OF PHENYTOIN TOTAL: CPT

## 2022-10-28 ENCOUNTER — HOSPITAL ENCOUNTER (EMERGENCY)
Age: 47
Discharge: HOME | End: 2022-10-28
Attending: EMERGENCY MEDICINE
Payer: MEDICAID

## 2022-10-28 VITALS
SYSTOLIC BLOOD PRESSURE: 128 MMHG | DIASTOLIC BLOOD PRESSURE: 74 MMHG | TEMPERATURE: 98.6 F | HEART RATE: 76 BPM | RESPIRATION RATE: 16 BRPM | OXYGEN SATURATION: 99 %

## 2022-10-28 DIAGNOSIS — R07.89 CHEST WALL PAIN: Primary | ICD-10-CM

## 2022-10-28 PROCEDURE — 99283 EMERGENCY DEPT VISIT LOW MDM: CPT

## 2022-10-28 PROCEDURE — 6370000000 HC RX 637 (ALT 250 FOR IP): Performed by: STUDENT IN AN ORGANIZED HEALTH CARE EDUCATION/TRAINING PROGRAM

## 2022-10-28 RX ORDER — IBUPROFEN 600 MG/1
600 TABLET ORAL 4 TIMES DAILY PRN
Qty: 20 TABLET | Refills: 0 | Status: SHIPPED | OUTPATIENT
Start: 2022-10-28

## 2022-10-28 RX ORDER — LIDOCAINE 4 G/G
1 PATCH TOPICAL ONCE
Status: DISCONTINUED | OUTPATIENT
Start: 2022-10-28 | End: 2022-10-28 | Stop reason: HOSPADM

## 2022-10-28 RX ORDER — ACETAMINOPHEN 500 MG
500 TABLET ORAL EVERY 6 HOURS PRN
Qty: 20 TABLET | Refills: 0 | Status: SHIPPED | OUTPATIENT
Start: 2022-10-28

## 2022-10-28 RX ORDER — IBUPROFEN 400 MG/1
600 TABLET ORAL ONCE
Status: COMPLETED | OUTPATIENT
Start: 2022-10-28 | End: 2022-10-28

## 2022-10-28 RX ORDER — LIDOCAINE 4 G/G
1 PATCH TOPICAL DAILY
Qty: 10 EACH | Refills: 0 | Status: SHIPPED | OUTPATIENT
Start: 2022-10-28 | End: 2022-11-07

## 2022-10-28 RX ADMIN — IBUPROFEN 600 MG: 400 TABLET, FILM COATED ORAL at 11:42

## 2022-10-28 ASSESSMENT — ENCOUNTER SYMPTOMS
DIARRHEA: 0
SHORTNESS OF BREATH: 0
ABDOMINAL PAIN: 0
NAUSEA: 0
VOMITING: 0
EYE REDNESS: 0
RHINORRHEA: 0

## 2022-10-28 ASSESSMENT — PAIN - FUNCTIONAL ASSESSMENT: PAIN_FUNCTIONAL_ASSESSMENT: 0-10

## 2022-10-28 ASSESSMENT — PAIN SCALES - GENERAL: PAINLEVEL_OUTOF10: 4

## 2022-10-28 NOTE — Clinical Note
Radha Muller was seen and treated in our emergency department on 10/28/2022. He may return to work on 10/29/2022. If you have any questions or concerns, please don't hesitate to call.       Bobby Goldmann, MD
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
## 2022-10-28 NOTE — ED TRIAGE NOTES
pt arrived to triage via self  pt c/o left side rib pain. PT in NAD  S/S started approximately 4 days ago. Writer is extended triage nurse. Assessment and treatment for this patient was primarily performed by resident and attending with extended triage nurse performing tasks as directed. Pt seen primarily by resident and attending physicians. RN assessment deferred to physician assessment.       Raj Juárez

## 2022-10-28 NOTE — DISCHARGE INSTRUCTIONS
Your evaluated today for pain in your left chest.  It is recommended that you use ibuprofen and Tylenol for pain control. Tylenol can be taken every 6 hours. Ibuprofen can be taken every 6 hours. It is recommended you alternate the two every three hours. Example pain medication schedule:  - 9am: Tylenol  - 12 pm/noon: Ibuprofen  - 3pm: Tylenol  - 6pm: Ibuprofen    You may also use lidocaine patch prescribed. This can be worn for 12 hours at a time. Then take it off for 12 hours before replacing with a new lidocaine patch. Please follow-up with your primary care physician for reevaluation. 1 was not listed in her chart so you have been recommended to the emergency family practice at [facility] [place] Please call to set up a primary care physician.
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
## 2023-01-25 ENCOUNTER — HOSPITAL ENCOUNTER (EMERGENCY)
Age: 48
Discharge: HOME | End: 2023-01-25
Attending: EMERGENCY MEDICINE
Payer: MEDICAID

## 2023-01-25 ENCOUNTER — APPOINTMENT (OUTPATIENT)
Dept: GENERAL RADIOLOGY | Age: 48
End: 2023-01-25
Payer: MEDICAID

## 2023-01-25 VITALS
RESPIRATION RATE: 16 BRPM | SYSTOLIC BLOOD PRESSURE: 136 MMHG | OXYGEN SATURATION: 99 % | TEMPERATURE: 98 F | HEART RATE: 67 BPM | DIASTOLIC BLOOD PRESSURE: 86 MMHG

## 2023-01-25 DIAGNOSIS — S60.032D CONTUSION OF LEFT MIDDLE FINGER WITHOUT DAMAGE TO NAIL, SUBSEQUENT ENCOUNTER: Primary | ICD-10-CM

## 2023-01-25 PROCEDURE — 6370000000 HC RX 637 (ALT 250 FOR IP): Performed by: EMERGENCY MEDICINE

## 2023-01-25 PROCEDURE — 99283 EMERGENCY DEPT VISIT LOW MDM: CPT

## 2023-01-25 PROCEDURE — 73130 X-RAY EXAM OF HAND: CPT

## 2023-01-25 RX ORDER — IBUPROFEN 600 MG/1
600 TABLET ORAL 4 TIMES DAILY PRN
Qty: 20 TABLET | Refills: 0 | Status: SHIPPED | OUTPATIENT
Start: 2023-01-25

## 2023-01-25 RX ORDER — IBUPROFEN 800 MG/1
800 TABLET ORAL ONCE
Status: COMPLETED | OUTPATIENT
Start: 2023-01-25 | End: 2023-01-25

## 2023-01-25 RX ADMIN — IBUPROFEN 800 MG: 800 TABLET, FILM COATED ORAL at 09:38

## 2023-01-25 ASSESSMENT — PAIN DESCRIPTION - ORIENTATION: ORIENTATION: LEFT

## 2023-01-25 ASSESSMENT — PAIN - FUNCTIONAL ASSESSMENT: PAIN_FUNCTIONAL_ASSESSMENT: 0-10

## 2023-01-25 ASSESSMENT — LIFESTYLE VARIABLES
HOW OFTEN DO YOU HAVE A DRINK CONTAINING ALCOHOL: NEVER
HOW MANY STANDARD DRINKS CONTAINING ALCOHOL DO YOU HAVE ON A TYPICAL DAY: PATIENT DOES NOT DRINK

## 2023-01-25 ASSESSMENT — PAIN DESCRIPTION - PAIN TYPE: TYPE: ACUTE PAIN

## 2023-01-25 ASSESSMENT — PAIN DESCRIPTION - LOCATION: LOCATION: FINGER (COMMENT WHICH ONE)

## 2023-01-25 ASSESSMENT — PAIN SCALES - GENERAL: PAINLEVEL_OUTOF10: 10

## 2023-01-25 NOTE — ED PROVIDER NOTES
[facility] ED  EMERGENCY DEPARTMENT ENCOUNTER    Pt Name: Charlie Morse  MRN: [id number]  Birthdate1975  Date of evaluation: 1/25/2023      CHIEF COMPLAINT       Chief Complaint   Patient presents with    Finger Injury     L Middle finger         HISTORY OF PRESENT ILLNESS    Charlie Morse is a 52 y.o. male who presents left middle finger injury. He is left-handed. States last night he went to shut a door and instead of grabbing the handle grabbed the edge of the door and smashed his fingers. Increased pain in the left middle finger this morning. Did not take anything for pain does not have anything at home to take. No other injuries no other complaints. REVIEW OF SYSTEMS       Review of Systems   Musculoskeletal:  Positive for arthralgias. Skin:  Negative for wound. PAST MEDICAL HISTORY    has a past medical history of Depression, Fracture, Seizures ([address] Snores, Under care of team, Wears dentures, Wears glasses, and Wellness examination. SURGICAL HISTORY      has a past surgical history that includes Ankle surgery (Right, 05/27/2022); Ankle fracture surgery (Right, 5/27/2022); Ankle surgery (Right, 06/10/2022); and Ankle fracture surgery (Right, 6/10/2022). CURRENT MEDICATIONS       Current Discharge Medication List        CONTINUE these medications which have NOT CHANGED    Details   acetaminophen (TYLENOL) 500 MG tablet Take 1 tablet by mouth every 6 hours as needed for Pain  Qty: 20 tablet, Refills: 0      divalproex (DEPAKOTE) 250 MG DR tablet TAKE 3 TABLETS BY MOUTH TWICE A DAY  Qty: 180 tablet, Refills: 2    Comments: Maximum Refills Reached      naproxen (NAPROSYN) 500 MG tablet Take 1 tablet by mouth 2 times daily (with meals)  Qty: 60 tablet, Refills: 0      gabapentin (NEURONTIN) 100 MG capsule Take 1 capsule by mouth nightly for 30 days.  Intended supply: 30 days  Qty: 30 capsule, Refills: 0      !! docusate sodium (COLACE) 100 mg capsule Take 1 capsule by mouth daily as needed for Constipation  Qty: 30 capsule, Refills: 0      Misc. Devices MISC Wheelchair with adjustable removable foot rests  Qty: 1 each, Refills: 0    Associated Diagnoses: Closed displaced trimalleolar fracture of right ankle, initial encounter      !! docusate sodium (COLACE) 100 mg capsule Take 1 capsule by mouth 2 times daily  Qty: 20 capsule, Refills: 0      gabapentin (NEURONTIN) 100 MG capsule Take 1 capsule by mouth 3 times daily for 14 days. Qty: 42 capsule, Refills: 0      phenytoin (PHENYTEK) 300 MG ER capsule Take 1 capsule by mouth daily Unsure of dose  Qty: 30 capsule, Refills: 2      simvastatin (ZOCOR) 40 MG tablet       furosemide (LASIX) 20 MG tablet Take 20 mg by mouth daily      dicyclomine (BENTYL) 20 MG tablet Take 1 tablet by mouth every 6 hours  Qty: 15 tablet, Refills: 0      ondansetron (ZOFRAN ODT) 4 MG disintegrating tablet Take 1 tablet by mouth every 8 hours as needed for Nausea  Qty: 10 tablet, Refills: 0       !! - Potential duplicate medications found. Please discuss with provider. ALLERGIES     has No Known Allergies. FAMILY HISTORY     He indicated that his mother is alive. He indicated that his father is . family history is not on file. SOCIAL HISTORY      reports that he has been smoking cigarettes. He has a 46.00 pack-year smoking history. He has never used smokeless tobacco. He reports current drug use. Drug: Marijuana Candiss Littler). He reports that he does not drink alcohol. PHYSICAL EXAM     INITIAL VITALS:  oral temperature is 98 °F (36.7 °C). His blood pressure is 136/86 and his pulse is 67. His respiration is 16 and oxygen saturation is 99%. Physical Exam  Constitutional:       Appearance: Normal appearance. Cardiovascular:      Rate and Rhythm: Normal rate. Pulmonary:      Effort: Pulmonary effort is normal.   Musculoskeletal:      Comments: Focal tenderness swelling of the left third PIP. No deformity.   No other tenderness in the left hand   Skin:     Capillary Refill: Capillary refill takes less than 2 seconds. Neurological:      General: No focal deficit present. Mental Status: He is alert and oriented to person, place, and time. Comments: Intact sensation to light touch in all digits   Psychiatric:         Mood and Affect: Mood normal.       DIFFERENTIAL DIAGNOSIS/ MDM:     Medical Decision Making  Given injury concern for fracture, contusion, dislocation, sprain sprain, strain. Will image, treat pain. Amount and/or Complexity of Data Reviewed  Radiology: ordered. Risk  Prescription drug management. DIAGNOSTIC RESULTS     EKG: All EKG's are interpreted by the Emergency Department Physician who either signs or Co-signs this chart in the [address] a cardiologist.    none    RADIOLOGY:   I directly visualized the following  images and reviewed theradiologist interpretations:     XR HAND LEFT (MIN 3 VIEWS)   Preliminary Result   1. Soft tissue swelling of the 3rd digit without acute osseous abnormality,   soft tissue gas or foreign body identified. 2.  Old healed fracture of the 5th metacarpal.                 ED BEDSIDE ULTRASOUND:   none    LABS:  Labs Reviewed - No data to display    none    EMERGENCY DEPARTMENT COURSE:   Vitals:    Vitals:    01/25/23 0929   BP: 136/86   Pulse: 67   Resp: 16   Temp: 98 °F (36.7 °C)   TempSrc: Oral   SpO2: 99%     -------------------------  BP: 136/86, Temp: 98 °F (36.7 °C), Heart Rate: 67, Resp: 16    10:34 AM EST  Patient updated with negative x-rays. Will provide a foam finger splint for comfort. Patient voiced understands that he may take this off as needed that is purely for his comfort. Does not have a PCP will refer to PCP. We will also prescribe prescription for Motrin. CRITICAL CARE:     none    CONSULTS:  None    PROCEDURES:  None    FINAL IMPRESSION      1.  Contusion of left middle finger without damage to nail, subsequent encounter DISPOSITION/PLAN       PATIENT REFERRED TO:  [address] [address] [phone]  In 1 week      DISCHARGE MEDICATIONS:  Current Discharge Medication List          (Please note that portions of this note were completed with a voice recognition program.  Efforts were made to edit the dictations butoccasionally words are mis-transcribed. )    Mainor Yousif MD  Attending Emergency Physician                   Mainor Yousif MD  01/25/23 0181

## 2023-01-25 NOTE — ED NOTES
Pt states that he shut his L middle finger in a door the other night. Pt states his pain is a 10/10. Pt's L middle finger is swollen and painful to the touch.       Leslie Alcantar  01/25/23 0929
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
## 2023-04-12 ENCOUNTER — HOSPITAL ENCOUNTER (EMERGENCY)
Age: 48
Discharge: HOME | End: 2023-04-12
Attending: EMERGENCY MEDICINE
Payer: MEDICAID

## 2023-04-12 ENCOUNTER — APPOINTMENT (OUTPATIENT)
Dept: GENERAL RADIOLOGY | Age: 48
End: 2023-04-12
Payer: MEDICAID

## 2023-04-12 VITALS
OXYGEN SATURATION: 94 % | RESPIRATION RATE: 16 BRPM | HEART RATE: 74 BPM | DIASTOLIC BLOOD PRESSURE: 70 MMHG | TEMPERATURE: 97.3 F | SYSTOLIC BLOOD PRESSURE: 134 MMHG

## 2023-04-12 DIAGNOSIS — M79.642 LEFT HAND PAIN: Primary | ICD-10-CM

## 2023-04-12 PROCEDURE — 73110 X-RAY EXAM OF WRIST: CPT

## 2023-04-12 PROCEDURE — 73130 X-RAY EXAM OF HAND: CPT

## 2023-04-12 PROCEDURE — 29125 APPL SHORT ARM SPLINT STATIC: CPT

## 2023-04-12 PROCEDURE — 99283 EMERGENCY DEPT VISIT LOW MDM: CPT

## 2023-04-12 PROCEDURE — 6370000000 HC RX 637 (ALT 250 FOR IP): Performed by: STUDENT IN AN ORGANIZED HEALTH CARE EDUCATION/TRAINING PROGRAM

## 2023-04-12 RX ORDER — IBUPROFEN 800 MG/1
800 TABLET ORAL EVERY 8 HOURS PRN
Qty: 15 TABLET | Refills: 0 | Status: SHIPPED | OUTPATIENT
Start: 2023-04-12 | End: 2023-04-17

## 2023-04-12 RX ORDER — ACETAMINOPHEN 500 MG
1000 TABLET ORAL ONCE
Status: COMPLETED | OUTPATIENT
Start: 2023-04-12 | End: 2023-04-12

## 2023-04-12 RX ORDER — ACETAMINOPHEN 500 MG
1000 TABLET ORAL EVERY 8 HOURS PRN
Qty: 15 TABLET | Refills: 0 | Status: SHIPPED | OUTPATIENT
Start: 2023-04-12 | End: 2023-04-17

## 2023-04-12 RX ADMIN — ACETAMINOPHEN 1000 MG: 500 TABLET ORAL at 13:15

## 2023-04-12 ASSESSMENT — ENCOUNTER SYMPTOMS
NAUSEA: 0
ABDOMINAL PAIN: 0
DIARRHEA: 0
TROUBLE SWALLOWING: 0
COLOR CHANGE: 0
BACK PAIN: 0
VOMITING: 0
CONSTIPATION: 0
SHORTNESS OF BREATH: 0
COUGH: 0
CHEST TIGHTNESS: 0

## 2023-04-12 ASSESSMENT — PAIN - FUNCTIONAL ASSESSMENT: PAIN_FUNCTIONAL_ASSESSMENT: NONE - DENIES PAIN

## 2023-04-12 NOTE — DISCHARGE INSTRUCTIONS
Please wear thumb spica splint for comfort. Use Tylenol Motrin for pain control. If your symptoms persist for 1 week return to the emergency department for repeat x-ray as initial fractures can be missed.
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
## 2023-04-12 NOTE — ED PROVIDER NOTES
[facility]     Emergency Department     Faculty Attestation    I performed a history and physical examination of the patient and discussed management with the resident. I reviewed the residents note and agree with the documented findings and plan of care. Any areas of disagreement are noted on the chart. I was personally present for the key portions of any procedures. I have documented in the chart those procedures where I was not present during the key portions. I have reviewed the emergency nurses triage note. I agree with the chief complaint, past medical history, past surgical history, allergies, medications, social and family history as documented unless otherwise noted below. Documentation of the HPI, Physical Exam and Medical Decision Making performed by medical students or scribes is based on my personal performance of the HPI, PE and MDM. For Physician Assistant/ Nurse Practitioner cases/documentation I have personally evaluated this patient and have completed at least one if not all key elements of the E/M (history, physical exam, and MDM). Additional findings are as noted. Vital signs:   Vitals:    04/12/23 1234   BP: 134/70   Pulse: 74   Resp: 16   Temp: 97.3 °F (36.3 °C)   SpO2: 94%      S/p fall on outstretched left hand. Tender over the base of the thumb, distal radius, and anatomic snuffbox. X-ray negative for fracture. Plan for a thumb spica and close follow up.              Brittani Castillo M.D,  Attending Emergency  Physician            Wilder Buenrostro MD  04/12/23 8052
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
## 2023-05-22 ENCOUNTER — HOSPITAL ENCOUNTER (OUTPATIENT)
Age: 48
Discharge: HOME | End: 2023-05-22
Payer: MEDICAID

## 2023-05-22 LAB
ALBUMIN SERPL-MCNC: 4.2 G/DL (ref 3.5–5.2)
ALBUMIN/GLOB SERPL: 1.6 {RATIO} (ref 1–2.5)
ALP SERPL-CCNC: 100 U/L (ref 40–129)
ALT SERPL-CCNC: 33 U/L (ref 5–41)
ANION GAP SERPL CALCULATED.3IONS-SCNC: 9 MMOL/L (ref 9–17)
AST SERPL-CCNC: 18 U/L
BASOPHILS # BLD: 0.04 K/UL (ref 0–0.2)
BASOPHILS NFR BLD: 1 % (ref 0–2)
BILIRUB SERPL-MCNC: 0.5 MG/DL (ref 0.3–1.2)
BUN SERPL-MCNC: 12 MG/DL (ref 6–20)
CALCIUM SERPL-MCNC: 9 MG/DL (ref 8.6–10.4)
CHLORIDE SERPL-SCNC: 107 MMOL/L (ref 98–107)
CHOLEST SERPL-MCNC: 144 MG/DL
CHOLESTEROL/HDL RATIO: 3.1
CO2 SERPL-SCNC: 24 MMOL/L (ref 20–31)
CREAT SERPL-MCNC: 0.69 MG/DL (ref 0.7–1.2)
CREAT UR-MCNC: 136.7 MG/DL (ref 39–259)
EOSINOPHIL # BLD: 0.1 K/UL (ref 0–0.44)
EOSINOPHILS RELATIVE PERCENT: 2 % (ref 1–4)
ERYTHROCYTE [DISTWIDTH] IN BLOOD BY AUTOMATED COUNT: 12.4 % (ref 11.8–14.4)
EST. AVERAGE GLUCOSE BLD GHB EST-MCNC: 103 MG/DL
GFR SERPL CREATININE-BSD FRML MDRD: >60 ML/MIN/1.73M2
GLUCOSE SERPL-MCNC: 96 MG/DL (ref 70–99)
HBA1C MFR BLD: 5.2 % (ref 4–6)
HCT VFR BLD AUTO: 44.9 % (ref 40.7–50.3)
HDLC SERPL-MCNC: 47 MG/DL
HGB BLD-MCNC: 15.3 G/DL (ref 13–17)
IMM GRANULOCYTES # BLD AUTO: <0.03 K/UL (ref 0–0.3)
IMM GRANULOCYTES NFR BLD: 0 %
LDLC SERPL CALC-MCNC: 85 MG/DL (ref 0–130)
LYMPHOCYTES # BLD: 26 % (ref 24–43)
LYMPHOCYTES NFR BLD: 1.7 K/UL (ref 1.1–3.7)
MCH RBC QN AUTO: 33 PG (ref 25.2–33.5)
MCHC RBC AUTO-ENTMCNC: 34.1 G/DL (ref 28.4–34.8)
MCV RBC AUTO: 96.8 FL (ref 82.6–102.9)
MICROALBUMIN UR-MCNC: <12 MG/L
MICROALBUMIN/CREAT UR-RTO: NORMAL MCG/MG CREAT
MONOCYTES NFR BLD: 0.67 K/UL (ref 0.1–1.2)
MONOCYTES NFR BLD: 10 % (ref 3–12)
NEUTROPHILS NFR BLD: 61 % (ref 36–65)
NEUTS SEG NFR BLD: 4.06 K/UL (ref 1.5–8.1)
NRBC AUTOMATED: 0 PER 100 WBC
PHENYTOIN DATE LAST DOSE: ABNORMAL
PHENYTOIN LEVEL: 4.4 UG/ML (ref 10–20)
PLATELET # BLD AUTO: 196 K/UL (ref 138–453)
PMV BLD AUTO: 10.5 FL (ref 8.1–13.5)
POTASSIUM SERPL-SCNC: 4.1 MMOL/L (ref 3.7–5.3)
PROT SERPL-MCNC: 6.9 G/DL (ref 6.4–8.3)
RBC # BLD AUTO: 4.64 M/UL (ref 4.21–5.77)
SODIUM SERPL-SCNC: 140 MMOL/L (ref 135–144)
TRIGL SERPL-MCNC: 61 MG/DL
TSH SERPL-MCNC: 2.19 UIU/ML (ref 0.3–5)
VALPROATE SERPL-MCNC: 45 UG/ML (ref 50–125)
VIT B12 SERPL-MCNC: 1315 PG/ML (ref 232–1245)
WBC OTHER # BLD: 6.6 K/UL (ref 3.5–11.3)

## 2023-05-22 PROCEDURE — 83036 HEMOGLOBIN GLYCOSYLATED A1C: CPT

## 2023-05-22 PROCEDURE — 82043 UR ALBUMIN QUANTITATIVE: CPT

## 2023-05-22 PROCEDURE — 36415 COLL VENOUS BLD VENIPUNCTURE: CPT

## 2023-05-22 PROCEDURE — 84443 ASSAY THYROID STIM HORMONE: CPT

## 2023-05-22 PROCEDURE — 80185 ASSAY OF PHENYTOIN TOTAL: CPT

## 2023-05-22 PROCEDURE — 82607 VITAMIN B-12: CPT

## 2023-05-22 PROCEDURE — 82570 ASSAY OF URINE CREATININE: CPT

## 2023-05-22 PROCEDURE — 85025 COMPLETE CBC W/AUTO DIFF WBC: CPT

## 2023-05-22 PROCEDURE — 80164 ASSAY DIPROPYLACETIC ACD TOT: CPT

## 2023-05-22 PROCEDURE — 82652 VIT D 1 25-DIHYDROXY: CPT

## 2023-05-22 PROCEDURE — 80061 LIPID PANEL: CPT

## 2023-05-22 PROCEDURE — 80053 COMPREHEN METABOLIC PANEL: CPT

## 2023-05-23 LAB — VITAMIN D 1,25-DIHYDROXY: 52.8 PG/ML (ref 19.9–79.3)

## 2023-05-31 ENCOUNTER — HOSPITAL ENCOUNTER (OUTPATIENT)
Age: 48
Discharge: HOME | End: 2023-05-31
Payer: MEDICAID

## 2023-05-31 LAB
ANION GAP SERPL CALCULATED.3IONS-SCNC: 8 MMOL/L (ref 9–17)
BUN SERPL-MCNC: 16 MG/DL (ref 6–20)
CALCIUM SERPL-MCNC: 9.1 MG/DL (ref 8.6–10.4)
CHLORIDE SERPL-SCNC: 107 MMOL/L (ref 98–107)
CO2 SERPL-SCNC: 25 MMOL/L (ref 20–31)
CREAT SERPL-MCNC: 0.66 MG/DL (ref 0.7–1.2)
GFR SERPL CREATININE-BSD FRML MDRD: >60 ML/MIN/1.73M2
GLUCOSE SERPL-MCNC: 102 MG/DL (ref 70–99)
PHENYTOIN LEVEL: 14.4 UG/ML (ref 10–20)
POTASSIUM SERPL-SCNC: 4.6 MMOL/L (ref 3.7–5.3)
SODIUM SERPL-SCNC: 140 MMOL/L (ref 135–144)
VALPROATE SERPL-MCNC: 47 UG/ML (ref 50–125)

## 2023-05-31 PROCEDURE — 80185 ASSAY OF PHENYTOIN TOTAL: CPT

## 2023-05-31 PROCEDURE — 36415 COLL VENOUS BLD VENIPUNCTURE: CPT

## 2023-05-31 PROCEDURE — 80048 BASIC METABOLIC PNL TOTAL CA: CPT

## 2023-05-31 PROCEDURE — 80164 ASSAY DIPROPYLACETIC ACD TOT: CPT

## 2023-07-31 ENCOUNTER — HOSPITAL ENCOUNTER (OUTPATIENT)
Age: 48
Discharge: HOME | End: 2023-07-31
Payer: MEDICAID

## 2023-07-31 LAB
HIV 1+2 AB+HIV1 P24 AG SERPL QL IA: NONREACTIVE
VALPROATE SERPL-MCNC: 41 UG/ML (ref 50–125)

## 2023-07-31 PROCEDURE — 80164 ASSAY DIPROPYLACETIC ACD TOT: CPT

## 2023-07-31 PROCEDURE — 36415 COLL VENOUS BLD VENIPUNCTURE: CPT

## 2023-07-31 PROCEDURE — 87389 HIV-1 AG W/HIV-1&-2 AB AG IA: CPT

## 2024-02-02 ENCOUNTER — APPOINTMENT (OUTPATIENT)
Dept: GENERAL RADIOLOGY | Age: 49
End: 2024-02-02
Payer: MEDICAID

## 2024-02-02 ENCOUNTER — HOSPITAL ENCOUNTER (EMERGENCY)
Age: 49
Discharge: HOME | End: 2024-02-02
Attending: EMERGENCY MEDICINE
Payer: MEDICAID

## 2024-02-02 VITALS
DIASTOLIC BLOOD PRESSURE: 81 MMHG | RESPIRATION RATE: 18 BRPM | TEMPERATURE: 97.4 F | HEART RATE: 68 BPM | WEIGHT: 184.97 LBS | SYSTOLIC BLOOD PRESSURE: 139 MMHG | BODY MASS INDEX: 28.12 KG/M2 | OXYGEN SATURATION: 98 %

## 2024-02-02 DIAGNOSIS — S62.114A CLOSED NONDISPLACED FRACTURE OF TRIQUETRUM OF RIGHT WRIST, INITIAL ENCOUNTER: Primary | ICD-10-CM

## 2024-02-02 PROCEDURE — 73130 X-RAY EXAM OF HAND: CPT

## 2024-02-02 PROCEDURE — 6370000000 HC RX 637 (ALT 250 FOR IP)

## 2024-02-02 PROCEDURE — 73562 X-RAY EXAM OF KNEE 3: CPT

## 2024-02-02 PROCEDURE — 73110 X-RAY EXAM OF WRIST: CPT

## 2024-02-02 PROCEDURE — 29125 APPL SHORT ARM SPLINT STATIC: CPT

## 2024-02-02 PROCEDURE — 99283 EMERGENCY DEPT VISIT LOW MDM: CPT

## 2024-02-02 RX ORDER — IBUPROFEN 400 MG/1
400 TABLET ORAL ONCE
Status: COMPLETED | OUTPATIENT
Start: 2024-02-02 | End: 2024-02-02

## 2024-02-02 RX ADMIN — IBUPROFEN 400 MG: 400 TABLET, FILM COATED ORAL at 14:36

## 2024-02-02 ASSESSMENT — ENCOUNTER SYMPTOMS
ABDOMINAL PAIN: 0
SHORTNESS OF BREATH: 0
CHEST TIGHTNESS: 0
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
## 2024-02-02 NOTE — DISCHARGE INSTRUCTIONS
Please follow up with orthopedics in 2 weeks as scheduled  If your pain and swelling worsens, please return to emergency room     Orthopaedic Instructions:  -Weight bearing status: Non weight bearing with the right arm  -Do not remove dressings or cast/splint until your follow up date. It is important that you do not get your cast/splint wet. To avoid this and still maintain proper hygiene, you can wrap a garbage/plastic bag (or similar waterproof material) about the arm/leg and secure it with tape while showering. One should still attempt to keep extremity out of water with this method. If your cast/splint were to fall off, it is important that you do not attempt to put it back on. Instead, return to the orthopaedic clinic for reapplication (see number below to call). If your dressings fall off, call office with any questions on reapplication.  -Always look for signs of compartment syndrome: pain out of proportion to the injury, pain not controlled with pain medication, numbness in digits, changing of color of digits (paleness). If these signs occur return to ED immediately for reassessment.  -Always work on finger motion to decrease swelling.  -Ice (20 minutes on and off 1 hour) and elevate to reduce swelling and throbbing pain.  -Call the office or come to Emergency Room if signs of infection appear (hot, swollen, red, draining pus, fever)  -Take medications as prescribed.  -Wean off narcotics (percocet/norco) as soon as possible. Do not take tylenol if still taking narcotics.  -Follow up with Dr. Khalil in his office on 2/19/24 at 1:00pm. Call [phone] to schedule/confirm or with any questions/concerns.     Cast Care Instructions:       Home care  Wear your cast according to your doctor’s instructions.  Keep the cast dry at all times. Bathe with your cast well out of the water. You can hold the cast outside the tub or shower when bathing. Protect it with a large plastic bag closed at the top end with a
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
## 2024-02-19 ENCOUNTER — OFFICE VISIT (OUTPATIENT)
Dept: ORTHOPEDIC SURGERY | Age: 49
End: 2024-02-19
Payer: MEDICAID

## 2024-02-19 VITALS — WEIGHT: 185 LBS | HEIGHT: 68 IN | BODY MASS INDEX: 28.04 KG/M2

## 2024-02-19 DIAGNOSIS — R52 PAIN: Primary | ICD-10-CM

## 2024-02-19 DIAGNOSIS — S62.111A TRIQUETRAL CHIP FRACTURE, RIGHT, CLOSED, INITIAL ENCOUNTER: ICD-10-CM

## 2024-02-19 PROCEDURE — G8427 DOCREV CUR MEDS BY ELIG CLIN: HCPCS | Performed by: STUDENT IN AN ORGANIZED HEALTH CARE EDUCATION/TRAINING PROGRAM

## 2024-02-19 PROCEDURE — 4004F PT TOBACCO SCREEN RCVD TLK: CPT | Performed by: STUDENT IN AN ORGANIZED HEALTH CARE EDUCATION/TRAINING PROGRAM

## 2024-02-19 PROCEDURE — G8419 CALC BMI OUT NRM PARAM NOF/U: HCPCS | Performed by: STUDENT IN AN ORGANIZED HEALTH CARE EDUCATION/TRAINING PROGRAM

## 2024-02-19 PROCEDURE — 99204 OFFICE O/P NEW MOD 45 MIN: CPT | Performed by: STUDENT IN AN ORGANIZED HEALTH CARE EDUCATION/TRAINING PROGRAM

## 2024-02-19 PROCEDURE — G8484 FLU IMMUNIZE NO ADMIN: HCPCS | Performed by: STUDENT IN AN ORGANIZED HEALTH CARE EDUCATION/TRAINING PROGRAM

## 2024-02-19 PROCEDURE — 25630 CLTX CARPL FX W/O MNPJ EA B1: CPT | Performed by: STUDENT IN AN ORGANIZED HEALTH CARE EDUCATION/TRAINING PROGRAM
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
## 2024-03-24 ENCOUNTER — HOSPITAL ENCOUNTER (EMERGENCY)
Age: 49
Discharge: HOME | End: 2024-03-24
Attending: EMERGENCY MEDICINE
Payer: MEDICAID

## 2024-03-24 VITALS
OXYGEN SATURATION: 95 % | HEART RATE: 80 BPM | DIASTOLIC BLOOD PRESSURE: 80 MMHG | BODY MASS INDEX: 28.89 KG/M2 | SYSTOLIC BLOOD PRESSURE: 135 MMHG | TEMPERATURE: 97.8 F | WEIGHT: 190 LBS | RESPIRATION RATE: 17 BRPM

## 2024-03-24 DIAGNOSIS — Z79.899 SEIZURE SECONDARY TO SUBTHERAPEUTIC ANTICONVULSANT MEDICATION (HCC): Primary | ICD-10-CM

## 2024-03-24 DIAGNOSIS — R56.9 SEIZURE SECONDARY TO SUBTHERAPEUTIC ANTICONVULSANT MEDICATION (HCC): Primary | ICD-10-CM

## 2024-03-24 LAB
ANION GAP SERPL CALCULATED.3IONS-SCNC: 20 MMOL/L (ref 9–16)
BASOPHILS # BLD: 0.04 K/UL (ref 0–0.2)
BASOPHILS NFR BLD: 1 % (ref 0–2)
BUN SERPL-MCNC: 11 MG/DL (ref 6–20)
CALCIUM SERPL-MCNC: 9 MG/DL (ref 8.6–10.4)
CHLORIDE SERPL-SCNC: 104 MMOL/L (ref 98–107)
CO2 SERPL-SCNC: 16 MMOL/L (ref 20–31)
CREAT SERPL-MCNC: 0.9 MG/DL (ref 0.7–1.2)
EOSINOPHIL # BLD: <0.03 K/UL (ref 0–0.44)
EOSINOPHILS RELATIVE PERCENT: 0 % (ref 1–4)
ERYTHROCYTE [DISTWIDTH] IN BLOOD BY AUTOMATED COUNT: 12.5 % (ref 11.8–14.4)
GFR SERPL CREATININE-BSD FRML MDRD: >60 ML/MIN/1.73M2
GLUCOSE BLD-MCNC: 126 MG/DL (ref 75–110)
GLUCOSE SERPL-MCNC: 128 MG/DL (ref 74–99)
HCT VFR BLD AUTO: 49 % (ref 40.7–50.3)
HGB BLD-MCNC: 16.1 G/DL (ref 13–17)
IMM GRANULOCYTES # BLD AUTO: 0.03 K/UL (ref 0–0.3)
IMM GRANULOCYTES NFR BLD: 1 %
LYMPHOCYTES NFR BLD: 1.2 K/UL (ref 1.1–3.7)
LYMPHOCYTES RELATIVE PERCENT: 22 % (ref 24–43)
MCH RBC QN AUTO: 33.1 PG (ref 25.2–33.5)
MCHC RBC AUTO-ENTMCNC: 32.9 G/DL (ref 28.4–34.8)
MCV RBC AUTO: 100.8 FL (ref 82.6–102.9)
MONOCYTES NFR BLD: 0.45 K/UL (ref 0.1–1.2)
MONOCYTES NFR BLD: 8 % (ref 3–12)
NEUTROPHILS NFR BLD: 68 % (ref 36–65)
NEUTS SEG NFR BLD: 3.85 K/UL (ref 1.5–8.1)
NRBC BLD-RTO: 0 PER 100 WBC
PHENYTOIN SERPL-MCNC: 2.5 UG/ML (ref 10–20)
PLATELET # BLD AUTO: 141 K/UL (ref 138–453)
PMV BLD AUTO: 11.9 FL (ref 8.1–13.5)
POTASSIUM SERPL-SCNC: 3.8 MMOL/L (ref 3.7–5.3)
RBC # BLD AUTO: 4.86 M/UL (ref 4.21–5.77)
SODIUM SERPL-SCNC: 140 MMOL/L (ref 136–145)
VALPROATE FREE MFR SERPL: NORMAL % (ref 5–18.4)
VALPROATE FREE SERPL-MCNC: NORMAL UG/ML (ref 7–23)
VALPROATE SERPL-MCNC: 29 UG/ML (ref 50–125)
WBC OTHER # BLD: 5.6 K/UL (ref 3.5–11.3)

## 2024-03-24 PROCEDURE — 6370000000 HC RX 637 (ALT 250 FOR IP)

## 2024-03-24 PROCEDURE — 85025 COMPLETE CBC W/AUTO DIFF WBC: CPT

## 2024-03-24 PROCEDURE — 80165 DIPROPYLACETIC ACID FREE: CPT

## 2024-03-24 PROCEDURE — 93005 ELECTROCARDIOGRAM TRACING: CPT | Performed by: EMERGENCY MEDICINE

## 2024-03-24 PROCEDURE — 99283 EMERGENCY DEPT VISIT LOW MDM: CPT | Performed by: EMERGENCY MEDICINE

## 2024-03-24 PROCEDURE — 80048 BASIC METABOLIC PNL TOTAL CA: CPT

## 2024-03-24 PROCEDURE — 82947 ASSAY GLUCOSE BLOOD QUANT: CPT

## 2024-03-24 PROCEDURE — 80164 ASSAY DIPROPYLACETIC ACD TOT: CPT

## 2024-03-24 PROCEDURE — 80185 ASSAY OF PHENYTOIN TOTAL: CPT

## 2024-03-24 RX ORDER — DIVALPROEX SODIUM 500 MG/1
TABLET, DELAYED RELEASE ORAL
COMMUNITY
Start: 2024-02-14

## 2024-03-24 RX ORDER — DIVALPROEX SODIUM 250 MG/1
250 TABLET, DELAYED RELEASE ORAL ONCE
Status: COMPLETED | OUTPATIENT
Start: 2024-03-24 | End: 2024-03-24

## 2024-03-24 RX ORDER — PHENYTOIN SODIUM 100 MG/1
CAPSULE, EXTENDED RELEASE ORAL
COMMUNITY
Start: 2024-02-01

## 2024-03-24 RX ADMIN — DIVALPROEX SODIUM 250 MG: 250 TABLET, DELAYED RELEASE ORAL at 15:29
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
## 2024-03-24 NOTE — DISCHARGE INSTRUCTIONS
You have been seen and evaluated in the emergency department.  Your laboratory work did not show any signs of infection.  Depakote levels were below the therapeutic threshold for seizures, you were given a dose of this medication in the ER.    Schedule an appointment with neurology for follow-up of your seizures.    Return to the emergency department immediately if you begin experiencing seizures, severe headache, difficulty moving arms or legs, difficulty walking, vision changes, chest pain or shortness of breath.
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
## 2024-03-24 NOTE — ED NOTES
The following labs were labeled with appropriate pt sticker and tubed to lab:     [x] Blue     [x] Lavender   [] on ice  [x] Green/yellow  [] Green/black [] on ice  [] Grey  [] on ice  [] Yellow  [x] Red  [] Pink  [] Type/ Screen  [] ABG  [] VBG    [] COVID-19 swab    [] Rapid  [] PCR  [] Flu swab  [] Peds Viral Panel     [] Urine Sample  [] Fecal Sample  [] Pelvic Cultures  [] Blood Cultures  [] X 2  [] STREP Cultures  [] Wound Cultures

## 2024-03-25 LAB
EKG ATRIAL RATE: 72 BPM
EKG P AXIS: 73 DEGREES
EKG P-R INTERVAL: 138 MS
EKG Q-T INTERVAL: 388 MS
EKG QRS DURATION: 100 MS
EKG QTC CALCULATION (BAZETT): 424 MS
EKG R AXIS: 87 DEGREES
EKG T AXIS: 56 DEGREES
EKG VENTRICULAR RATE: 72 BPM

## 2024-03-25 PROCEDURE — 93010 ELECTROCARDIOGRAM REPORT: CPT | Performed by: INTERNAL MEDICINE

## 2024-03-27 LAB
MISCELLANEOUS LAB TEST RESULT: ABNORMAL
TEST NAME: ABNORMAL

## 2024-03-31 ENCOUNTER — HOSPITAL ENCOUNTER (EMERGENCY)
Age: 49
Discharge: HOME | End: 2024-03-31
Attending: EMERGENCY MEDICINE
Payer: MEDICAID

## 2024-03-31 ENCOUNTER — APPOINTMENT (OUTPATIENT)
Dept: CT IMAGING | Age: 49
End: 2024-03-31
Payer: MEDICAID

## 2024-03-31 VITALS
HEART RATE: 65 BPM | RESPIRATION RATE: 24 BRPM | DIASTOLIC BLOOD PRESSURE: 77 MMHG | OXYGEN SATURATION: 99 % | SYSTOLIC BLOOD PRESSURE: 134 MMHG | TEMPERATURE: 98.1 F

## 2024-03-31 DIAGNOSIS — D32.9 MENINGIOMA (HCC): ICD-10-CM

## 2024-03-31 DIAGNOSIS — G40.919 BREAKTHROUGH SEIZURE (HCC): Primary | ICD-10-CM

## 2024-03-31 LAB
ANION GAP SERPL CALCULATED.3IONS-SCNC: 24 MMOL/L (ref 9–16)
BASOPHILS # BLD: 0.06 K/UL (ref 0–0.2)
BASOPHILS NFR BLD: 1 % (ref 0–2)
BUN SERPL-MCNC: 13 MG/DL (ref 6–20)
CALCIUM SERPL-MCNC: 9.1 MG/DL (ref 8.6–10.4)
CHLORIDE SERPL-SCNC: 102 MMOL/L (ref 98–107)
CO2 SERPL-SCNC: 11 MMOL/L (ref 20–31)
CREAT SERPL-MCNC: 0.9 MG/DL (ref 0.7–1.2)
DATE LAST DOSE: ABNORMAL
EOSINOPHIL # BLD: 0.04 K/UL (ref 0–0.44)
EOSINOPHILS RELATIVE PERCENT: 1 % (ref 1–4)
ERYTHROCYTE [DISTWIDTH] IN BLOOD BY AUTOMATED COUNT: 12.2 % (ref 11.8–14.4)
GFR SERPL CREATININE-BSD FRML MDRD: >90 ML/MIN/1.73M2
GLUCOSE SERPL-MCNC: 145 MG/DL (ref 74–99)
HCT VFR BLD AUTO: 50.5 % (ref 40.7–50.3)
HGB BLD-MCNC: 16.3 G/DL (ref 13–17)
IMM GRANULOCYTES # BLD AUTO: 0.04 K/UL (ref 0–0.3)
IMM GRANULOCYTES NFR BLD: 1 %
LYMPHOCYTES NFR BLD: 1.84 K/UL (ref 1.1–3.7)
LYMPHOCYTES RELATIVE PERCENT: 22 % (ref 24–43)
MCH RBC QN AUTO: 33 PG (ref 25.2–33.5)
MCHC RBC AUTO-ENTMCNC: 32.3 G/DL (ref 28.4–34.8)
MCV RBC AUTO: 102.2 FL (ref 82.6–102.9)
MONOCYTES NFR BLD: 0.66 K/UL (ref 0.1–1.2)
MONOCYTES NFR BLD: 8 % (ref 3–12)
NEUTROPHILS NFR BLD: 67 % (ref 36–65)
NEUTS SEG NFR BLD: 5.93 K/UL (ref 1.5–8.1)
NRBC BLD-RTO: 0 PER 100 WBC
PHENYTOIN FREE SERPL-MCNC: <0.1 UG/ML (ref 1–2)
PLATELET # BLD AUTO: 196 K/UL (ref 138–453)
PMV BLD AUTO: 11.6 FL (ref 8.1–13.5)
POTASSIUM SERPL-SCNC: 4.2 MMOL/L (ref 3.7–5.3)
RBC # BLD AUTO: 4.94 M/UL (ref 4.21–5.77)
SODIUM SERPL-SCNC: 137 MMOL/L (ref 136–145)
TME LAST DOSE: ABNORMAL H
VALPROATE SERPL-MCNC: 32 UG/ML (ref 50–125)
VANCOMYCIN DOSE: ABNORMAL MG
WBC OTHER # BLD: 8.6 K/UL (ref 3.5–11.3)

## 2024-03-31 PROCEDURE — 85025 COMPLETE CBC W/AUTO DIFF WBC: CPT

## 2024-03-31 PROCEDURE — 99284 EMERGENCY DEPT VISIT MOD MDM: CPT

## 2024-03-31 PROCEDURE — 80048 BASIC METABOLIC PNL TOTAL CA: CPT

## 2024-03-31 PROCEDURE — 93005 ELECTROCARDIOGRAM TRACING: CPT | Performed by: STUDENT IN AN ORGANIZED HEALTH CARE EDUCATION/TRAINING PROGRAM

## 2024-03-31 PROCEDURE — 6370000000 HC RX 637 (ALT 250 FOR IP): Performed by: STUDENT IN AN ORGANIZED HEALTH CARE EDUCATION/TRAINING PROGRAM

## 2024-03-31 PROCEDURE — 72125 CT NECK SPINE W/O DYE: CPT

## 2024-03-31 PROCEDURE — 80186 ASSAY OF PHENYTOIN FREE: CPT

## 2024-03-31 PROCEDURE — 70450 CT HEAD/BRAIN W/O DYE: CPT

## 2024-03-31 PROCEDURE — 80164 ASSAY DIPROPYLACETIC ACD TOT: CPT

## 2024-03-31 RX ORDER — DIVALPROEX SODIUM 500 MG/1
500 TABLET, DELAYED RELEASE ORAL ONCE
Status: COMPLETED | OUTPATIENT
Start: 2024-03-31 | End: 2024-03-31

## 2024-03-31 RX ADMIN — DIVALPROEX SODIUM 500 MG: 500 TABLET, DELAYED RELEASE ORAL at 14:30
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
## 2024-03-31 NOTE — DISCHARGE INSTRUCTIONS
You were seen the emergency department for a seizure.  We gave you a dose of Depakote.  Make sure you are taking your medications as prescribed.    The CT of your head showed a meningioma which is a tiny mass in your brain that less likely cause a seizure however you need to follow-up with the neurosurgery within the next week regarding this finding.    Continue to follow-up with your neurologist regarding your seizures.

## 2024-04-02 LAB
EKG ATRIAL RATE: 79 BPM
EKG P AXIS: 79 DEGREES
EKG P-R INTERVAL: 134 MS
EKG Q-T INTERVAL: 376 MS
EKG QRS DURATION: 92 MS
EKG QTC CALCULATION (BAZETT): 431 MS
EKG R AXIS: 91 DEGREES
EKG T AXIS: 61 DEGREES
EKG VENTRICULAR RATE: 79 BPM

## 2024-04-02 PROCEDURE — 93010 ELECTROCARDIOGRAM REPORT: CPT | Performed by: INTERNAL MEDICINE

## 2024-04-04 LAB
MISCELLANEOUS LAB TEST RESULT: ABNORMAL
TEST NAME: ABNORMAL

## 2024-05-23 ENCOUNTER — HOSPITAL ENCOUNTER (EMERGENCY)
Age: 49
Discharge: HOME | End: 2024-05-23
Attending: EMERGENCY MEDICINE
Payer: MEDICAID

## 2024-05-23 VITALS
TEMPERATURE: 98 F | OXYGEN SATURATION: 96 % | HEART RATE: 77 BPM | RESPIRATION RATE: 16 BRPM | DIASTOLIC BLOOD PRESSURE: 79 MMHG | HEIGHT: 70 IN | SYSTOLIC BLOOD PRESSURE: 126 MMHG | BODY MASS INDEX: 26.48 KG/M2 | WEIGHT: 185 LBS
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
## 2024-05-23 PROCEDURE — 90471 IMMUNIZATION ADMIN: CPT | Performed by: STUDENT IN AN ORGANIZED HEALTH CARE EDUCATION/TRAINING PROGRAM

## 2024-05-23 PROCEDURE — 6360000002 HC RX W HCPCS: Performed by: STUDENT IN AN ORGANIZED HEALTH CARE EDUCATION/TRAINING PROGRAM

## 2024-05-23 PROCEDURE — 99284 EMERGENCY DEPT VISIT MOD MDM: CPT

## 2024-05-23 PROCEDURE — 90715 TDAP VACCINE 7 YRS/> IM: CPT | Performed by: STUDENT IN AN ORGANIZED HEALTH CARE EDUCATION/TRAINING PROGRAM

## 2024-05-23 PROCEDURE — 6370000000 HC RX 637 (ALT 250 FOR IP): Performed by: STUDENT IN AN ORGANIZED HEALTH CARE EDUCATION/TRAINING PROGRAM

## 2024-05-23 RX ORDER — AMOXICILLIN AND CLAVULANATE POTASSIUM 875; 125 MG/1; MG/1
1 TABLET, FILM COATED ORAL EVERY 12 HOURS SCHEDULED
Status: DISCONTINUED | OUTPATIENT
Start: 2024-05-23 | End: 2024-05-24 | Stop reason: HOSPADM

## 2024-05-23 RX ORDER — AMOXICILLIN AND CLAVULANATE POTASSIUM 875; 125 MG/1; MG/1
1 TABLET, FILM COATED ORAL 2 TIMES DAILY
Qty: 14 TABLET | Refills: 0 | Status: SHIPPED | OUTPATIENT
Start: 2024-05-23 | End: 2024-05-30

## 2024-05-23 RX ADMIN — AMOXICILLIN AND CLAVULANATE POTASSIUM 1 TABLET: 875; 125 TABLET, FILM COATED ORAL at 23:16

## 2024-05-23 RX ADMIN — TETANUS TOXOID, REDUCED DIPHTHERIA TOXOID AND ACELLULAR PERTUSSIS VACCINE, ADSORBED 0.5 ML: 5; 2.5; 8; 8; 2.5 SUSPENSION INTRAMUSCULAR at 23:17

## 2024-05-23 ASSESSMENT — PAIN DESCRIPTION - PAIN TYPE: TYPE: ACUTE PAIN

## 2024-05-23 ASSESSMENT — PAIN SCALES - GENERAL: PAINLEVEL_OUTOF10: 10

## 2024-05-23 ASSESSMENT — PAIN DESCRIPTION - LOCATION: LOCATION: RIB CAGE;BACK

## 2024-05-23 ASSESSMENT — PAIN - FUNCTIONAL ASSESSMENT: PAIN_FUNCTIONAL_ASSESSMENT: 0-10

## 2024-05-23 ASSESSMENT — PAIN DESCRIPTION - DESCRIPTORS: DESCRIPTORS: BURNING

## 2024-05-23 ASSESSMENT — PAIN DESCRIPTION - FREQUENCY: FREQUENCY: INTERMITTENT

## 2024-05-23 ASSESSMENT — PAIN DESCRIPTION - ORIENTATION: ORIENTATION: LEFT;LOWER

## 2024-05-24 NOTE — DISCHARGE INSTRUCTIONS
You are seen in emergency department for dog bite versus scratch.  Wound does not appear to deep.  We will update your tetanus status.  Will also start you on antibiotics as dog wounds have a high chance of becoming infected.  You are given your first dose here.  You are given a prescription to take to pharmacy.  Please follow-up with primary care provider in the next 1 to 2 weeks for reevaluation.  Please return for any new or worsening symptoms including worsening pain, redness, drainage, swelling, or any symptom being concerning.

## 2024-05-24 NOTE — ED PROVIDER NOTES
[facility] ED     Emergency Department     Faculty Attestation    I performed a history and physical examination of the patient and discussed management with the resident. I reviewed the resident’s note and agree with the documented findings and plan of care. Any areas of disagreement are noted on the chart. I was personally present for the key portions of any procedures. I have documented in the chart those procedures where I was not present during the key portions. I have reviewed the emergency nurses triage note. I agree with the chief complaint, past medical history, past surgical history, allergies, medications, social and family history as documented unless otherwise noted below. For Physician Assistant/ Nurse Practitioner cases/documentation I have personally evaluated this patient and have completed at least one if not all key elements of the E/M (history, physical exam, and MDM). Additional findings are as noted.    Note Started: 11:02 PM EDT    Patient here with dog injury to the left chest.  States that he was breaking up a fight between his dog and another dog the dog jumped up ripped his shirt he is unsure if it was a bite or a paw.  Large linear abrasion on the left lateral chest but wound edges do not separate no rib tenderness no left upper tenderness lungs clear and equal.  Will clean wound Augmentin plan discharge      Critical Care     none    Earl Chester MD, FACEP, FAAEM  Attending Emergency  Physician           Earl Chester MD  05/23/24 5937    
taking: Reported on 6/8/2022 5/27/22   Marry Villegas DO   gabapentin (NEURONTIN) 100 MG capsule Take 1 capsule by mouth 3 times daily for 14 days.  Patient not taking: Reported on 6/8/2022 5/27/22 6/10/22  Marry Villegas DO   phenytoin (PHENYTEK) 300 MG ER capsule Take 1 capsule by mouth daily Unsure of dose  Patient taking differently: Take 1 capsule by mouth 2 times daily Unsure of dose 4/28/22 7/27/22  Missy Belcher MD   simvastatin (ZOCOR) 40 MG tablet  12/30/20   Nevaeh Serrano MD   furosemide (LASIX) 20 MG tablet Take 20 mg by mouth daily  Patient not taking: Reported on 6/22/2021    ProviderNevaeh MD   dicyclomine (BENTYL) 20 MG tablet Take 1 tablet by mouth every 6 hours  Patient not taking: Reported on 6/22/2021 4/30/18   Shaheen Brock MD   ondansetron (ZOFRAN ODT) 4 MG disintegrating tablet Take 1 tablet by mouth every 8 hours as needed for Nausea  Patient not taking: Reported on 6/22/2021 4/30/18   Shaheen Brock MD     ***    REVIEW OF SYSTEMS       Review of Systems    PHYSICAL EXAM      INITIAL VITALS:   /79   Pulse 77   Temp 98 °F (36.7 °C) (Oral)   Resp 16   Ht 1.778 m (5' 10\")   Wt 83.9 kg (185 lb)   SpO2 96%   BMI 26.54 kg/m²     Physical Exam      DDX/DIAGNOSTIC RESULTS / EMERGENCY DEPARTMENT COURSE / MDM     Medical Decision Making  Risk  Prescription drug management.        EKG  ***    All EKG's are interpreted by the Emergency Department Physician who either signs or Co-signs this chart in the absence of a cardiologist.    EMERGENCY DEPARTMENT COURSE:  ***         PROCEDURES:  ***    CONSULTS:  None    CRITICAL CARE:  There was significant risk of life threatening deterioration of patient's condition requiring my direct management. Critical care time *** minutes, excluding any documented procedures.    FINAL IMPRESSION      1. Dog scratch          DISPOSITION / PLAN     DISPOSITION Decision To Discharge 05/23/2024 11:05:57 PM      PATIENT
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
## 2024-05-29 ASSESSMENT — ENCOUNTER SYMPTOMS
NAUSEA: 0
SORE THROAT: 0
SHORTNESS OF BREATH: 0
WHEEZING: 0
BACK PAIN: 0
VOMITING: 0
DIARRHEA: 0
ABDOMINAL PAIN: 0
COUGH: 0

## 2024-07-08 ENCOUNTER — HOSPITAL ENCOUNTER (EMERGENCY)
Age: 49
Discharge: HOME | End: 2024-07-09
Attending: EMERGENCY MEDICINE
Payer: MEDICAID

## 2024-07-08 VITALS
DIASTOLIC BLOOD PRESSURE: 78 MMHG | SYSTOLIC BLOOD PRESSURE: 125 MMHG | WEIGHT: 180 LBS | HEART RATE: 63 BPM | BODY MASS INDEX: 25.83 KG/M2 | OXYGEN SATURATION: 96 % | RESPIRATION RATE: 18 BRPM | TEMPERATURE: 98.1 F

## 2024-07-08 DIAGNOSIS — S00.83XA CONTUSION OF JAW, INITIAL ENCOUNTER: Primary | ICD-10-CM

## 2024-07-08 PROCEDURE — 99284 EMERGENCY DEPT VISIT MOD MDM: CPT | Performed by: EMERGENCY MEDICINE

## 2024-07-08 ASSESSMENT — LIFESTYLE VARIABLES
HOW OFTEN DO YOU HAVE A DRINK CONTAINING ALCOHOL: PATIENT DECLINED
HOW MANY STANDARD DRINKS CONTAINING ALCOHOL DO YOU HAVE ON A TYPICAL DAY: PATIENT DECLINED

## 2024-07-08 ASSESSMENT — PAIN SCALES - GENERAL: PAINLEVEL_OUTOF10: 8

## 2024-07-09 ENCOUNTER — APPOINTMENT (OUTPATIENT)
Dept: CT IMAGING | Age: 49
End: 2024-07-09
Payer: MEDICAID

## 2024-07-09 PROCEDURE — 70450 CT HEAD/BRAIN W/O DYE: CPT

## 2024-07-09 PROCEDURE — 70486 CT MAXILLOFACIAL W/O DYE: CPT

## 2024-07-09 PROCEDURE — 72125 CT NECK SPINE W/O DYE: CPT

## 2024-07-09 PROCEDURE — 6370000000 HC RX 637 (ALT 250 FOR IP)

## 2024-07-09 RX ORDER — ACETAMINOPHEN 325 MG/1
650 TABLET ORAL ONCE
Status: COMPLETED | OUTPATIENT
Start: 2024-07-09 | End: 2024-07-09

## 2024-07-09 RX ORDER — METHOCARBAMOL 500 MG/1
500 TABLET, FILM COATED ORAL ONCE
Status: COMPLETED | OUTPATIENT
Start: 2024-07-09 | End: 2024-07-09

## 2024-07-09 RX ORDER — IBUPROFEN 200 MG
600 TABLET ORAL EVERY 8 HOURS PRN
Qty: 30 TABLET | Refills: 0 | Status: SHIPPED | OUTPATIENT
Start: 2024-07-09 | End: 2024-07-16

## 2024-07-09 RX ADMIN — METHOCARBAMOL 500 MG: 500 TABLET ORAL at 00:14

## 2024-07-09 RX ADMIN — ACETAMINOPHEN 650 MG: 325 TABLET ORAL at 00:14
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
## 2024-11-02 ENCOUNTER — APPOINTMENT (OUTPATIENT)
Dept: GENERAL RADIOLOGY | Age: 49
End: 2024-11-02
Payer: MEDICAID

## 2024-11-02 ENCOUNTER — APPOINTMENT (OUTPATIENT)
Dept: CT IMAGING | Age: 49
End: 2024-11-02
Payer: MEDICAID

## 2024-11-02 ENCOUNTER — HOSPITAL ENCOUNTER (EMERGENCY)
Age: 49
Discharge: LEFT BEFORE DISCHARGE | End: 2024-11-02
Attending: EMERGENCY MEDICINE
Payer: MEDICAID

## 2024-11-02 VITALS
OXYGEN SATURATION: 95 % | BODY MASS INDEX: 27.49 KG/M2 | WEIGHT: 192 LBS | HEIGHT: 70 IN | HEART RATE: 50 BPM | RESPIRATION RATE: 14 BRPM | SYSTOLIC BLOOD PRESSURE: 115 MMHG | TEMPERATURE: 97 F | DIASTOLIC BLOOD PRESSURE: 64 MMHG

## 2024-11-02 DIAGNOSIS — R56.9 SEIZURE (HCC): ICD-10-CM

## 2024-11-02 DIAGNOSIS — S09.90XA INJURY OF HEAD, INITIAL ENCOUNTER: Primary | ICD-10-CM

## 2024-11-02 LAB
ANION GAP SERPL CALCULATED.3IONS-SCNC: 19 MMOL/L (ref 9–16)
BASOPHILS # BLD: 0.04 K/UL (ref 0–0.2)
BASOPHILS NFR BLD: 1 % (ref 0–2)
BUN SERPL-MCNC: 12 MG/DL (ref 6–20)
CALCIUM SERPL-MCNC: 8.9 MG/DL (ref 8.6–10.4)
CHLORIDE SERPL-SCNC: 105 MMOL/L (ref 98–107)
CO2 SERPL-SCNC: 15 MMOL/L (ref 20–31)
CREAT SERPL-MCNC: 0.9 MG/DL (ref 0.7–1.2)
EOSINOPHIL # BLD: 0.1 K/UL (ref 0–0.44)
EOSINOPHILS RELATIVE PERCENT: 2 % (ref 1–4)
ERYTHROCYTE [DISTWIDTH] IN BLOOD BY AUTOMATED COUNT: 12.4 % (ref 11.8–14.4)
GFR, ESTIMATED: >90 ML/MIN/1.73M2
GLUCOSE SERPL-MCNC: 139 MG/DL (ref 74–99)
HCT VFR BLD AUTO: 47.4 % (ref 40.7–50.3)
HGB BLD-MCNC: 15.8 G/DL (ref 13–17)
IMM GRANULOCYTES # BLD AUTO: <0.03 K/UL (ref 0–0.3)
IMM GRANULOCYTES NFR BLD: 0 %
LYMPHOCYTES NFR BLD: 1.59 K/UL (ref 1.1–3.7)
LYMPHOCYTES RELATIVE PERCENT: 30 % (ref 24–43)
MCH RBC QN AUTO: 32.7 PG (ref 25.2–33.5)
MCHC RBC AUTO-ENTMCNC: 33.3 G/DL (ref 28.4–34.8)
MCV RBC AUTO: 98.1 FL (ref 82.6–102.9)
MONOCYTES NFR BLD: 0.47 K/UL (ref 0.1–1.2)
MONOCYTES NFR BLD: 9 % (ref 3–12)
NEUTROPHILS NFR BLD: 59 % (ref 36–65)
NEUTS SEG NFR BLD: 3.14 K/UL (ref 1.5–8.1)
NRBC BLD-RTO: 0 PER 100 WBC
PHENYTOIN FREE SERPL-MCNC: <0.1 UG/ML (ref 1–2)
PLATELET # BLD AUTO: NORMAL K/UL (ref 138–453)
PLATELET, FLUORESCENCE: NORMAL K/UL (ref 138–453)
POTASSIUM SERPL-SCNC: 4.1 MMOL/L (ref 3.7–5.3)
RBC # BLD AUTO: 4.83 M/UL (ref 4.21–5.77)
SODIUM SERPL-SCNC: 139 MMOL/L (ref 136–145)
VALPROATE SERPL-MCNC: 27 UG/ML (ref 50–125)
WBC OTHER # BLD: 5.4 K/UL (ref 3.5–11.3)

## 2024-11-02 PROCEDURE — 99285 EMERGENCY DEPT VISIT HI MDM: CPT | Performed by: EMERGENCY MEDICINE

## 2024-11-02 PROCEDURE — 80164 ASSAY DIPROPYLACETIC ACD TOT: CPT

## 2024-11-02 PROCEDURE — 71046 X-RAY EXAM CHEST 2 VIEWS: CPT

## 2024-11-02 PROCEDURE — 85055 RETICULATED PLATELET ASSAY: CPT

## 2024-11-02 PROCEDURE — 80048 BASIC METABOLIC PNL TOTAL CA: CPT

## 2024-11-02 PROCEDURE — 80186 ASSAY OF PHENYTOIN FREE: CPT

## 2024-11-02 PROCEDURE — 70450 CT HEAD/BRAIN W/O DYE: CPT

## 2024-11-02 PROCEDURE — 85025 COMPLETE CBC W/AUTO DIFF WBC: CPT

## 2024-11-02 PROCEDURE — 72125 CT NECK SPINE W/O DYE: CPT

## 2024-11-02 PROCEDURE — 93005 ELECTROCARDIOGRAM TRACING: CPT

## 2024-11-02 ASSESSMENT — PAIN - FUNCTIONAL ASSESSMENT: PAIN_FUNCTIONAL_ASSESSMENT: NONE - DENIES PAIN
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
## 2024-11-02 NOTE — ED NOTES
Pt states that he wants to leave and that he has somewhere that he has to go.   Pt aware of the risks of leaving.   Pt A&O x 4 at this time.
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
## 2024-11-03 LAB
EKG ATRIAL RATE: 83 BPM
EKG P AXIS: 67 DEGREES
EKG P-R INTERVAL: 132 MS
EKG Q-T INTERVAL: 366 MS
EKG QRS DURATION: 94 MS
EKG QTC CALCULATION (BAZETT): 430 MS
EKG R AXIS: 75 DEGREES
EKG T AXIS: 55 DEGREES
EKG VENTRICULAR RATE: 83 BPM

## 2024-11-04 PROCEDURE — 93010 ELECTROCARDIOGRAM REPORT: CPT | Performed by: INTERNAL MEDICINE

## 2024-11-07 ENCOUNTER — HOSPITAL ENCOUNTER (OUTPATIENT)
Age: 49
Discharge: HOME | End: 2024-11-07
Payer: MEDICAID

## 2024-11-07 LAB
CHOLEST SERPL-MCNC: 130 MG/DL (ref 0–199)
CHOLESTEROL/HDL RATIO: 3.1
HDLC SERPL-MCNC: 42 MG/DL
LDLC SERPL CALC-MCNC: 74 MG/DL (ref 0–100)
PHENYTOIN SERPL-MCNC: 3.3 UG/ML (ref 10–20)
TRIGL SERPL-MCNC: 68 MG/DL
TSH SERPL DL<=0.05 MIU/L-ACNC: 2.16 UIU/ML (ref 0.27–4.2)
VALPROATE SERPL-MCNC: 35 UG/ML (ref 50–125)
VLDLC SERPL CALC-MCNC: 14 MG/DL (ref 1–30)

## 2024-11-07 PROCEDURE — 36415 COLL VENOUS BLD VENIPUNCTURE: CPT

## 2024-11-07 PROCEDURE — 84443 ASSAY THYROID STIM HORMONE: CPT

## 2024-11-07 PROCEDURE — 80061 LIPID PANEL: CPT

## 2024-11-07 PROCEDURE — 80164 ASSAY DIPROPYLACETIC ACD TOT: CPT

## 2024-11-07 PROCEDURE — 80185 ASSAY OF PHENYTOIN TOTAL: CPT

## 2024-12-06 ENCOUNTER — HOSPITAL ENCOUNTER (EMERGENCY)
Age: 49
Discharge: HOME | End: 2024-12-06
Attending: EMERGENCY MEDICINE
Payer: MEDICAID

## 2024-12-06 ENCOUNTER — APPOINTMENT (OUTPATIENT)
Dept: GENERAL RADIOLOGY | Age: 49
End: 2024-12-06
Payer: MEDICAID

## 2024-12-06 VITALS
OXYGEN SATURATION: 95 % | RESPIRATION RATE: 19 BRPM | SYSTOLIC BLOOD PRESSURE: 129 MMHG | HEART RATE: 87 BPM | TEMPERATURE: 98.1 F | DIASTOLIC BLOOD PRESSURE: 68 MMHG | WEIGHT: 182.54 LBS | BODY MASS INDEX: 26.19 KG/M2

## 2024-12-06 DIAGNOSIS — M67.921 TENDINOPATHY OF RIGHT ELBOW: Primary | ICD-10-CM

## 2024-12-06 PROCEDURE — 99283 EMERGENCY DEPT VISIT LOW MDM: CPT | Performed by: EMERGENCY MEDICINE

## 2024-12-06 PROCEDURE — 73080 X-RAY EXAM OF ELBOW: CPT

## 2024-12-06 RX ORDER — IBUPROFEN 600 MG/1
600 TABLET, FILM COATED ORAL EVERY 8 HOURS PRN
Qty: 10 TABLET | Refills: 0 | Status: SHIPPED | OUTPATIENT
Start: 2024-12-06

## 2024-12-06 ASSESSMENT — ENCOUNTER SYMPTOMS
BACK PAIN: 0
DIARRHEA: 0
COLOR CHANGE: 0
NAUSEA: 0
ABDOMINAL PAIN: 0
WHEEZING: 0
SHORTNESS OF BREATH: 0
VOMITING: 0

## 2024-12-06 ASSESSMENT — PAIN DESCRIPTION - ORIENTATION: ORIENTATION: RIGHT

## 2024-12-06 ASSESSMENT — PAIN - FUNCTIONAL ASSESSMENT: PAIN_FUNCTIONAL_ASSESSMENT: 0-10

## 2024-12-06 ASSESSMENT — PAIN DESCRIPTION - PAIN TYPE: TYPE: ACUTE PAIN

## 2024-12-06 ASSESSMENT — PAIN SCALES - GENERAL: PAINLEVEL_OUTOF10: 10

## 2024-12-06 ASSESSMENT — PAIN DESCRIPTION - FREQUENCY: FREQUENCY: CONTINUOUS

## 2024-12-06 ASSESSMENT — PAIN DESCRIPTION - LOCATION: LOCATION: ARM

## 2024-12-06 NOTE — ED NOTES
Pt to ed via ambulatory to room with complaints of right arm pain  Pt states he had a seizure a week ago  Pt states ever since then his right arm is sore  Pt states he think he landed on his right arm during seizure  Pt states pian 10/10 aching cont  Pt states he has a hard time picking up a 2 liter  Pt denies taking anything for symptoms  Pms intact  Pt alert and oriented x4, talking in complete sentences, respirations even and unlabored. Pt acting age appropriate. White board updated, will continue to plan of care

## 2024-12-06 NOTE — DISCHARGE INSTR - COC
select all that are sent with patient):  {CHP DME Belongings:223686988}    RN SIGNATURE:  {Esignature:[id number]}    CASE MANAGEMENT/SOCIAL WORK SECTION    Inpatient Status Date: ***    Readmission Risk Assessment Score:  Readmission Risk              Risk of Unplanned Readmission:  0           Discharging to Facility/ Agency   Name:   Address:  Phone:  Fax:    Dialysis Facility (if applicable)   Name:  Address:  Dialysis Schedule:  Phone:  Fax:    / signature: {Esignature:217302990}    PHYSICIAN SECTION    Prognosis: {Prognosis:8742436284}    Condition at Discharge: { Patient Condition:168565506}    Rehab Potential (if transferring to Rehab): {Prognosis:8687780458}    Recommended Labs or Other Treatments After Discharge: ***    Physician Certification: I certify the above information and transfer of Lucio Ladd  is necessary for the continuing treatment of the diagnosis listed and that he requires {Admit to Appropriate Level of Care:61475} for {GREATER/LESS:579480775} 30 days.     Update Admission H&P: {CHP DME Changes in HandP:939867896}    PHYSICIAN SIGNATURE:  {Esignature:289037461}

## 2024-12-06 NOTE — ED PROVIDER NOTES
[facility] EMERGENCY DEPARTMENT     Emergency Department     Faculty Attestation        I performed a history and physical examination of the patient and discussed management with the resident. I reviewed the resident’s note and agree with the documented findings and plan of care. Any areas of disagreement are noted on the chart. I was personally present for the key portions of any procedures. I have documented in the chart those procedures where I was not present during the key portions. I have reviewed the emergency nurses triage note. I agree with the chief complaint, past medical history, past surgical history, allergies, medications, social and family history as documented unless otherwise noted below.    For mid-level providers such as nurse practitioners as well as physicians assistants:    I have personally seen and evaluated the patient.    I find the patient's history and physical exam are consistent with NP/PA documentation.  I agree with the care provided, treatment rendered, disposition, & follow-up plan.     Additional findings are as noted.    Vital Signs: /68   Pulse 87   Temp 98.1 °F (36.7 °C) (Oral)   Resp 19   Wt 82.8 kg (182 lb 8.7 oz)   SpO2 95%   BMI 26.19 kg/m²   PCP:  Colin Ospina APRN - NP    Pertinent Comments:           Critical Care  None          Skip Schulte MD    Attending Emergency Medicine Physician            Darien Schulte MD  12/06/24 6360

## 2024-12-06 NOTE — ED PROVIDER NOTES
for 23.0 years (46.0 ttl pk-yrs)     Types: Cigarettes    Smokeless tobacco: Never   Vaping Use    Vaping status: Never Used   Substance and Sexual Activity    Alcohol use: No    Drug use: Yes     Types: Marijuana (Weed)     Comment: weed and K2 caused a seizure 2022    Sexual activity: Yes     Partners: Female   Other Topics Concern    Not on file   Social History Narrative    Not on file     Social Determinants of Health     Financial Resource Strain: Not on file   Food Insecurity: Not on file   Transportation Needs: Not on file   Physical Activity: Not on file   Stress: Not on file   Social Connections: Not on file   Intimate Partner Violence: Not on file   Housing Stability: Not on file       No family history on file.    Allergies:  Patient has no known allergies.    Home Medications:  Prior to Admission medications    Medication Sig Start Date End Date Taking? Authorizing Provider   ibuprofen (ADVIL;MOTRIN) 600 MG tablet Take 1 tablet by mouth every 8 hours as needed for Pain 12/6/24  Yes Dalton Fowler DO   phenytoin (DILANTIN) 100 MG ER capsule Take by mouth 2/1/24   ProviderNevaeh MD   divalproex (DEPAKOTE) 500 MG DR tablet  2/14/24   ProviderNevaeh MD   acetaminophen (TYLENOL) 500 MG tablet Take 2 tablets by mouth every 8 hours as needed for Pain 4/12/23 4/17/23  Negrito Caro DO   acetaminophen (TYLENOL) 500 MG tablet Take 1 tablet by mouth every 6 hours as needed for Pain  Patient not taking: Reported on 3/24/2024 10/28/22   Juanjose Sweet MD   divalproex (DEPAKOTE) 250 MG DR tablet TAKE 3 TABLETS BY MOUTH TWICE A DAY 9/20/22   Georgette Peter MD   naproxen (NAPROSYN) 500 MG tablet Take 1 tablet by mouth 2 times daily (with meals)  Patient not taking: Reported on 3/24/2024 6/10/22   Cyrus Griffin DO   docusate sodium (COLACE) 100 mg capsule Take 1 capsule by mouth daily as needed for Constipation  Patient not taking: Reported on 3/24/2024 6/10/22   Cyrus Griffin DO  RESULTS / EMERGENCY DEPARTMENT COURSE / MDM     Medical Decision Making  49 y.o. male with PMH including seizures who presents emergency department with right arm pain around his lateral elbow region.  see HPI and physical exam for further detail.  Patient is neurovascularly intact.  Exam is consistent with lateral epicondylitis or tendinopathy.  Recommended \"tennis elbow\" brace to the patient.  Will obtain elbow x-ray.  No signs of symptoms of compartment syndrome.  No outward signs of trauma.  Full range of motion all 4 extremities.  Strength 5/5 all 4 extremities.  Patient specifically states that he does not need anything for pain at this time    I do not think the patient has compartment syndrome at this time.  No Pain out of proportion   No Persistent deep ache or burning pain   No Paresthesias  No Pain with passive stretch of muscles in the affected compartment   No Tense compartments  No Pallor from vascular insufficiency   No Diminished sensation  No Muscle weakness   No Paralysis       Amount and/or Complexity of Data Reviewed  Radiology: ordered. Decision-making details documented in ED Course.        EKG  N/a    All EKG's are interpreted by the Emergency Department Physician who either signs or Co-signs this chart in the absence of a cardiologist.    EMERGENCY DEPARTMENT COURSE:    ED Course as of 12/06/24 1410   Fri Dec 06, 2024   1402 XR ELBOW RIGHT (MIN 3 VIEWS)  IMPRESSION:  No acute fracture or dislocation.   [GC]   1409 Medically stable to be discharged home.  Has appropriate follow-up.  Given appropriate return instructions.  Recommended tennis elbow brace to patient.  Given prescriptions for Motrin. [GC]      ED Course User Index  [GC] Dalton Fowler DO       PROCEDURES:  N/a    CONSULTS:  None    CRITICAL CARE:  There was significant risk of life threatening deterioration of patient's condition requiring my direct management. Critical care time 00 minutes, excluding any documented

## 2024-12-06 NOTE — DISCHARGE INSTRUCTIONS
Seen in the Emergency Department for right elbow pain.  Diagnosed with tendinopathy of the right elbow.  This is otherwise known as tennis elbow.  Please go to any pharmacy and attempt to find tennis elbow brace.  We do not stock these in the emergency department.  Given prescription for Motrin.  Please take this as prescribed.  Please follow-up with your primary care after the weekend for thing Monday morning.  Please return to the emergency department if you develop numbness and tingling in your hand, skin color changes, pain is worsening, or any other concerning symptoms

## 2025-06-26 NOTE — PROGRESS NOTES
[facility]  [id number] [facility]  Dept Phone: [phone]  Dept Fax: [phone]      [facility]uma Clinic Follow Up      Subjective:   Date of Surgery: 5/27/2022    Addis Chaves is a 55y.o. year old male who presents to the clinic today for routine follow up 11 days status post closed reduction of his right trimalleolar ankle fracture with application of a circular external fixator. Patient states he has been cleaning the pin sites with saline once a day but is not able to reach the most distal pins in his foot. States he has not been showering or cleaning the frame, just has been sponge bathing. States he has been compliant with NWB to the RLE by ambulating by wheelchair. Denies any numbness or tingling. Denies any new injuries or falls. Denies any drainage or erythema around the pin sites. Review of Systems  Gen: no fever, chills, malaise  CV: no chest pain or palpitations  Resp: no cough or shortness of breath  GI: no nausea, vomiting, diarrhea, or constipation  Neuro: no seizures, vertigo, or headache  Msk: right leg pain   10 remaining systems reviewed and negative    Objective : There were no vitals filed for this visit. Body mass index is 34.29 kg/m². General: No acute distress, resting comfortably in the clinic  Neuro: alert. oriented  Eyes: Extra-ocular muscles intact  Pulm: Respirations unlabored and regular. Skin: warm, well perfused  Psych:   Patient has good fund of knowledge and displays understanding of exam, diagnosis, and plan. RLE:  Ex-fix frame in good condition appropriately off of the skin. Skin intact. Previous fracture blisters healing without evidence of infection. Most of the swelling remains in the foot and ankle but able to wrinkle the skin. Pin sites without evidence of infection. Minimal serosanguinois drainage from the two most proximal pin sites. Compartments soft. 2+ DP pulse. Able to minimally flex/extend digits.  Limited SUBJECTIVE  Sakina Mitchell is a 64 year old female who presents today for evaluation and treatment of  hyperlipidemia.    The patient presents for evaluation of elevated calcium CT score, high cholesterol, atelectasis, microscopic hematuria, lipoma and possible cyst in the buttocks area.    She reports no current chest pain or shortness of breath. However, she experienced a significant decrease in heart rate during her walks following an increase in her beta blocker dosage,  This issue was previously discussed with Dr. Espinal.       Medication:  Current Outpatient Medications   Medication Sig Dispense Refill   • Suflave 178.7 g Recon Soln Take 1 kit by mouth as directed. See Colonoscopy Instructions (Patient not taking: Reported on 6/26/2025) 1 each 0   • famotidine (PEPCID) 20 MG tablet Take 1 tablet by mouth 2 times daily as needed (reflux). 60 tablet 1   • atorvastatin (LIPITOR) 20 MG tablet Take 1 tablet by mouth daily. 30 tablet 3   • cycloSPORINE (cycloSPORINE in [place]lar[place]) 0.1 % Emulsion Apply 1 drop to eye in the morning and 1 drop in the evening. 5.5 mL 3   • metoPROLOL succinate (TOPROL-XL) 25 MG 24 hr tablet Take 1 tablet by mouth every evening. 90 tablet 3   • diclofenac (VOLTAREN) 1 % gel Apply 4 g topically 4 times daily as needed (pain). 150 g 1   • fluticasone (FLONASE) 50 MCG/ACT nasal spray Spray 2 sprays in each nostril daily. 3 each 1   • azelaic acid (Finacea) 15 % gel Apply topically 2 times daily. For rosacea of face 50 g 5   • Omega-3 Fatty Acids (Fish Oil) 1000 MG capsule      • Magnesium Citrate 200 MG Tab Take 200 mg by mouth daily.     • azelastine (ASTELIN) 0.1 % nasal spray Spray 1 spray in each nostril 2 times daily. Use in each nostril as directed 30 mL 1   • FOLIC ACID PO Take 1 tablet by mouth every other day.      • CALCIUM-VITAMIN D PO Take 1 tablet by mouth daily.      • MULTIPLE VITAMIN PO Take 1 tablet by mouth daily.        No current facility-administered medications for this  motor function assessment due to ex-fix frame in place. Deep and Superficial Peroneal/Saphenous/Sural SILT. Radiology:   No radiographs obtained today. Assessment:   55y.o. year old male with a right trimalleolar ankle fracture dislocation s/p application of circular ex-fix; DOS: 5/27/2022. Plan:   - Plan for ex-fix removal and definitive fixation of the right ankle fracture on 6/10/2022. Patient verbalized understanding that he must call our office by Thursday if he is not able to obtain a ride for surgery this Friday. - Very clear, repetitive instructions provided to patient to keep cleaning the pin sites daily, wash over the entire frame in the shower with soap and water, keep the leg elevated as much as possible, and use ice as needed for swelling.   - Remain NWB to the RLE.  - F/u 2 weeks post op     Follow up:No follow-ups on file. No orders of the defined types were placed in this encounter. No orders of the defined types were placed in this encounter. Electronically signed by ELEONORA Redmond CNP on 6/7/2022 at 11:53 AM    This note is created with the assistance of a speech recognition program.  While intending to generate a document that actually reflects the content of the visit, the document can still have some errors including those of syntax and sound a like substitutions which may escape proof reading.   In such instances, actual meaning can be extrapolated by contextual diversion visit.             Patient Active Problem List   Diagnosis   • Dry eyes, bilateral   • HL (hallux limitus), unspecified laterality   • Metatarsalgia of both feet   • Osteoarthritis of right ankle and foot   • Beta thalassemia trait   • Pseudocholinesterase deficiency   • Hav (hallux abducto valgus), unspecified laterality   • Menopause   • Obesity (BMI 30-39.9)   • Pure hypercholesterolemia   • Vitreous floater, right   • Allergic rhinitis   • PVC's (premature ventricular contractions)   • Dyslipidemia, goal LDL below 100   • Venous insufficiency   • Anxiety   • Family history of Charcot-Tiffany-Tooth disease   • Laryngopharyngeal reflux (LPR)   • Chronic pain of both knees   • Patellofemoral pain syndrome of both knees   • Tricompartment osteoarthritis of both knees   • Left hand weakness   • Right foot pain   • Rosacea   • Hearing impaired person, bilateral   • Osteopenia of multiple sites   • Low folic acid         Past Surgical History:   Procedure Laterality Date   • Carpal tunnel release Left 10/2023   •  section, low transverse  ,   • Cholecystectomy      l/s    • Colonoscopy diagnostic  2020    colon polyps benign  rpt 5 yrs   • Foot surgery Right 2019   • Hernia repair N/A 2016    umbilical   • Tonsillectomy     • Varicose vein surgery         Social History     Tobacco Use   • Smoking status: Never   • Smokeless tobacco: Never   Vaping Use   • Vaping status: never used   Substance Use Topics   • Alcohol use: Not Currently     Comment: very rare    • Drug use: Never       Family History   Problem Relation Age of Onset   • Hyperlipidemia Mother         lung cancer   • Blood Disorder Mother         anemia   • Osteoporosis Mother    • Pacemaker Father         also aortic valve repair   • Other Father         pseudocholinesterase deficiency    • Thyroid Sister         stage 3 kidney disease   • Hypothyroid Sister    • Blood Disorder Maternal Grandmother         anemia    • Osteoporosis  Maternal Grandmother    • Thyroid Maternal Aunt    • Cancer, Breast Neg Hx    • Cancer, Colon Neg Hx    • Cancer, Ovarian Neg Hx        Allergies:  ALLERGIES:   Allergen Reactions   • Cholinesterase Inhibitors WEAKNESS     pseudocholinesterase deficiency   • Seasonal Other (See Comments)     Itchy watery eyes, sneezing   • Succinylcholine WEAKNESS     Prolonged paralysis   • Thimerosal RASH     Severe reaction to contact lens solution (topical)        Review of Systems:  Constitutional: Negative for fever, chills, change in appetite or fatigue.  Skin: Negative for rash or wounds.  HEENT: Negative for eye drainage, rhinorrhea, ear pain, sore throat or neck pain.  Respiratory: Negative cough, wheezing or shortness of breath.  Cardiovascular: Negative for chest pain, chest pressure, palpitations or diaphoresis.  Gastrointestinal: Negative for nausea, vomiting, diarrhea, abdominal pain, black or tarry stools.  Genitourinary: Negative for dysuria, urgency, frequency, hematuria or flank pain.  Extremities:  Negative for joint swelling or joint pain.  Neurologic:  Negative for change in sensory or motor function.  Negative for headache, change in gait, vertigo, vision or speech.  Endocrine: Negative for heat or cold intolerance, weight loss or gain.  Hematological: Negative for bleeding, bruising or lymphadenopathy.  Psychiatric: Negative for change in affect, anxiety, depression, mentation or sleep disturbance.    Recent Labs:    Recent Results (from the past 8 weeks)   Comprehensive Metabolic Panel    Collection Time: 05/19/25  6:49 AM    Specimen: Blood, Venous   Result Value Ref Range    Fasting Status      Sodium 143 135 - 145 mmol/L    Potassium 4.3 3.4 - 5.1 mmol/L    Chloride 105 97 - 110 mmol/L    Carbon Dioxide 30 21 - 32 mmol/L    Anion Gap 12 7 - 19 mmol/L    Glucose 97 70 - 99 mg/dL    BUN 20 6 - 20 mg/dL    Creatinine 0.87 0.51 - 0.95 mg/dL    Glomerular Filtration Rate 74 >=60    BUN/Cr 23 7 - 25    Calcium  9.5 8.4 - 10.2 mg/dL    Bilirubin, Total 1.0 0.2 - 1.0 mg/dL    GOT/AST 24 <=37 Units/L    GPT/ALT 34 <64 Units/L    Alkaline Phosphatase 82 45 - 117 Units/L    Albumin 3.8 3.4 - 5.0 g/dL    Protein, Total 6.7 6.4 - 8.2 g/dL    Globulin 2.9 2.0 - 4.0 g/dL    A/G Ratio 1.3 1.0 - 2.4   Glycohemoglobin    Collection Time: 05/19/25  6:49 AM    Specimen: Blood, Venous   Result Value Ref Range    Hemoglobin A1C 5.8 (H) 4.5 - 5.6 %   Lipid Panel With Reflex    Collection Time: 05/19/25  6:49 AM    Specimen: Blood, Venous   Result Value Ref Range    Cholesterol 229 (H) <=199 mg/dL    Triglycerides 107 <=149 mg/dL    HDL 55 >=50 mg/dL     (H) <=129 mg/dL    Non-HDL Cholesterol 174 mg/dL    Cholesterol/ HDL Ratio 4.2 <=4.4   Thyroid Stimulating Hormone Reflex    Collection Time: 05/19/25  6:49 AM    Specimen: Blood, Venous   Result Value Ref Range    TSH 1.494 0.350 - 5.000 mcUnits/mL   Vitamin D -25 Hydroxy    Collection Time: 05/19/25  6:49 AM    Specimen: Blood, Venous   Result Value Ref Range    Vitamin D, 25-Hydroxy 43.8 30.0 - 100.0 ng/mL   Vitamin B12    Collection Time: 05/19/25  6:49 AM    Specimen: Blood, Venous   Result Value Ref Range    Vitamin B12 333 211 - 911 pg/mL   CBC with Automated Differential (performable only)    Collection Time: 05/19/25  6:49 AM    Specimen: Blood, Venous   Result Value Ref Range    WBC 4.5 4.2 - 11.0 K/mcL    RBC 5.52 (H) 4.00 - 5.20 mil/mcL    HGB 11.8 (L) 12.0 - 15.5 g/dL    HCT 38.9 36.0 - 46.5 %    MCV 70.5 (L) 78.0 - 100.0 fl    MCH 21.4 (L) 26.0 - 34.0 pg    MCHC 30.3 (L) 32.0 - 36.5 g/dL    RDW-CV 15.9 (H) 11.0 - 15.0 %    RDW-SD 39.5 39.0 - 50.0 fL     140 - 450 K/mcL    NRBC 0 <=0 /100 WBC    Neutrophil, Percent 38 %    Lymphocytes, Percent 50 %    Mono, Percent 8 %    Eosinophils, Percent 3 %    Basophils, Percent 1 %    Immature Granulocytes 0 %    Absolute Neutrophils 1.7 (L) 1.8 - 7.7 K/mcL    Absolute Lymphocytes 2.3 1.0 - 4.0 K/mcL    Absolute Monocytes  0.3 0.3 - 0.9 K/mcL    Absolute Eosinophils  0.1 0.0 - 0.5 K/mcL    Absolute Basophils 0.1 0.0 - 0.3 K/mcL    Absolute Immature Granulocytes 0.0 0.0 - 0.2 K/mcL   Urinalysis & Reflex Microscopy With Culture If Indicated    Collection Time: 05/19/25  7:04 AM    Specimen: Urine clean catch   Result Value Ref Range    COLOR, URINALYSIS Yellow     APPEARANCE, URINALYSIS Cloudy     GLUCOSE, URINALYSIS Negative Negative mg/dL    BILIRUBIN, URINALYSIS Negative Negative    KETONES, URINALYSIS Negative Negative mg/dL    SPECIFIC GRAVITY, URINALYSIS 1.026 1.005 - 1.030    OCCULT BLOOD, URINALYSIS Negative Negative    PH, URINALYSIS 6.0 5.0 - 7.0    PROTEIN, URINALYSIS 30 (A) Negative mg/dL    UROBILINOGEN, URINALYSIS 0.2 0.2, 1.0 mg/dL    NITRITE, URINALYSIS Negative Negative    LEUKOCYTE ESTERASE, URINALYSIS Large (A) Negative    SQUAMOUS EPITHELIAL, URINALYSIS 1 to 5 None Seen, 1 to 5 /hpf    ERYTHROCYTES, URINALYSIS 3 to 5 (A) None Seen, 1 to 2 /hpf    LEUKOCYTES, URINALYSIS 26 to 100 (A) None Seen, 1 to 5 /hpf    BACTERIA, URINALYSIS None Seen None Seen /hpf    HYALINE CASTS, URINALYSIS 1 to 5 None Seen, 1 to 5 /lpf    TRANSITIONAL EPITHELIALS 1 to 5 1 to 5, None Seen /hpf    MUCUS Present    Urine, Bacterial Culture    Collection Time: 05/19/25  7:04 AM    Specimen: Urine clean catch   Result Value Ref Range    Urine, Bacterial Culture       10,000 - 50,000 CFU/mL Mixed bacterial iglesia with no predominating type   Urinalysis With Microscopy & Culture If Indicated    Collection Time: 05/29/25  7:34 AM    Specimen: Urine clean catch   Result Value Ref Range    COLOR, URINALYSIS Yellow     APPEARANCE, URINALYSIS Cloudy     GLUCOSE, URINALYSIS Negative Negative mg/dL    BILIRUBIN, URINALYSIS Negative Negative    KETONES, URINALYSIS Negative Negative mg/dL    SPECIFIC GRAVITY, URINALYSIS 1.020 1.005 - 1.030    OCCULT BLOOD, URINALYSIS Negative Negative    PH, URINALYSIS 6.0 5.0 - 7.0    PROTEIN, URINALYSIS Trace (A) Negative  mg/dL    UROBILINOGEN, URINALYSIS 0.2 0.2, 1.0 mg/dL    NITRITE, URINALYSIS Negative Negative    LEUKOCYTE ESTERASE, URINALYSIS Large (A) Negative    SQUAMOUS EPITHELIAL, URINALYSIS 1 to 5 None Seen, 1 to 5 /hpf    ERYTHROCYTES, URINALYSIS 3 to 5 (A) None Seen, 1 to 2 /hpf    LEUKOCYTES, URINALYSIS 26 to 100 (A) None Seen, 1 to 5 /hpf    BACTERIA, URINALYSIS None Seen None Seen /hpf    HYALINE CASTS, URINALYSIS 6 to 10 (A) None Seen, 1 to 5 /lpf    MUCUS Present    Urine, Bacterial Culture    Collection Time: 05/29/25  7:34 AM    Specimen: Urine clean catch   Result Value Ref Range    Urine, Bacterial Culture       10,000 - 50,000 CFU/mL Mixed bacterial iglesia with no predominating type       OBJECTIVE  Vitals: Visit Vitals  /72 (BP Location: RUE - Right upper extremity, Patient Position: Sitting, Cuff Size: Regular)   Pulse 62   Temp 97.1 °F (36.2 °C) (Temporal)   Ht 5' 9\" (1.753 m)   Wt 95.5 kg (210 lb 9.6 oz)   SpO2 98%   BMI 31.10 kg/m²     Sakina's BMI is Body mass index is 31.1 kg/m²., which is obese (BMI 30 - 34.9)    Physical exam  General:  Alert, cooperative, conversive. No acute distress.  Skin:  Warm and dry without rash.    Head:  Normocephalic-atraumatic.   Neck:   No adenopathy.  Normal thyroid without mass or tenderness..   Eyes:  Normal conjunctivae and sclerae.   Extraocular movements intact.  ENT:  Mucous membranes are moist.  Normal  external auditory canals bilaterally.  No pharyngeal erythema or exudate.  .  Normal nasal mucosa.  Cardiovascular:  Symmetrical pulses.  Regular, rate and rhythm without murmur.  Respiratory:  Normal respiratory effort.  Clear to auscultation.  No wheezes, rales or rhonchi.  Gastrointestinal:  Soft and nontender.  Normal bowel sounds.  No hepatomegaly or splenomegaly.   Musculoskeletal:  No deformity or edema.  No tenderness to palpation.  Neurologic:   Oriented times 4.  .  No focal deficits   Psychiatric:   Cooperative.  Appropriate mood and  affect.        ASSESSMENT/PLAN  Hematuria, microscopic  - Urinalysis & Reflex Microscopy With Culture If Indicated    Dyslipidemia, goal LDL below 100    Pure hypercholesterolemia (Primary)    Abnormal Heart Score CT    Agatston coronary artery calcium score between 100 and 199    Atelectasis    1. Elevated calcium CT score  - No current chest pain or shortness of breath reported  - Stress test results were normal   - started baby aspirin and Lipitor 20 mg  - Plan to recheck cholesterol levels before the next visit in August 2025    2. High cholesterol  - Current treatment initiated in 05/2025  - Plan to re-evaluate cholesterol levels prior to the next visit in 08/2025  - Blood work to be done before the August appointment    3. Atelectasis  - Recommended to perform deep breathing exercises    4. Microscopic hematuria  - No evidence of kidney stones  - Follow-up consultation with a urologist suggested    5. Lipoma and potential cyst in the buttocks region  - Future consultation with general surgeon Dr. Barclay recommended  - No immediate referral due to current multiple ongoing issues    6.  Calcium noted in stomach wall 9.8 mm follow-up with GI is having an EGD completed     Hailey Taylor,     This includes pre-charting, chart review, documenting, and referring/communicating with other health care professionals.    This time does not include any time spent on procedures.

## (undated) DEVICE — BNDG,ELSTC,MATRIX,STRL,4"X5YD,LF,HOOK&LP: Brand: MEDLINE

## (undated) DEVICE — TUBING AMB

## (undated) DEVICE — KIT FTPLT SUPP MAXFRAME

## (undated) DEVICE — GAUZE,SPONGE,FLUFF,6"X6.75",STRL,5/TRAY: Brand: MEDLINE

## (undated) DEVICE — BANDAGE,GAUZE,BULKEE II,4.5"X4.1YD,STRL: Brand: MEDLINE

## (undated) DEVICE — GLOVE ORANGE PI 8   MSG9080

## (undated) DEVICE — GARMENT,MEDLINE,DVT,INT,CALF,MED, GEN2: Brand: MEDLINE

## (undated) DEVICE — FOOTPLATE BNE LNG L180MM ALUMINUM MAXFRAME

## (undated) DEVICE — GLOVE ORANGE PI 8 1/2   MSG9085

## (undated) DEVICE — DRESSING,GAUZE,XEROFORM,CURAD,5"X9",ST: Brand: CURAD

## (undated) DEVICE — THE STERILE LIGHT HANDLE COVER IS USED WITH STERIS SURGICAL LIGHTING AND VISUALIZATION SYSTEMS.

## (undated) DEVICE — GLOVE ORANGE PI 7 1/2   MSG9075

## (undated) DEVICE — Device

## (undated) DEVICE — APPLICATOR MEDICATED 26 CC SOLUTION HI LT ORNG CHLORAPREP

## (undated) DEVICE — PREMIUM DRY TRAY LF: Brand: MEDLINE INDUSTRIES, INC.

## (undated) DEVICE — MARKER,SKIN,WI/RULER AND LABELS: Brand: MEDLINE

## (undated) DEVICE — GOWN,SIRUS,NONRNF,SETINSLV,2XL,18/CS: Brand: MEDLINE

## (undated) DEVICE — RING FIX FULL SZ 180MM ALUMINUM MAXFRAME

## (undated) DEVICE — NUT EXT FIX MRI SAFE FOR DISTR OSTEOGENESIS RNG SYS EA

## (undated) DEVICE — INTENDED FOR TISSUE SEPARATION, AND OTHER PROCEDURES THAT REQUIRE A SHARP SURGICAL BLADE TO PUNCTURE OR CUT.: Brand: BARD-PARKER ® CARBON RIB-BACK BLADES

## (undated) DEVICE — 3M™ IOBAN™ 2 ANTIMICROBIAL INCISE DRAPE 6650EZ: Brand: IOBAN™ 2

## (undated) DEVICE — BOLT EXT FIX CANN RNG MT FOR SCHNZ SCREW MR SAFE NS

## (undated) DEVICE — GLOVE,EXAM,NITRILE,RESTORE,OAT SENSE,L: Brand: MEDLINE

## (undated) DEVICE — SCREW BNE L16MM DIA3.5MM CORT S STL ST NONCANNULATED LOK: Type: IMPLANTABLE DEVICE | Site: ANKLE | Status: NON-FUNCTIONAL

## (undated) DEVICE — GLOVE ORTHO 8   MSG9480

## (undated) DEVICE — SUTURE VCRL SZ 0 L18IN ABSRB UD L36MM CT-1 1/2 CIR J840D

## (undated) DEVICE — GOWN,SURGICAL,AURORA,SLEEVE: Brand: MEDLINE

## (undated) DEVICE — C-ARM: Brand: UNBRANDED

## (undated) DEVICE — GOWN,SIRUS,NONRNF,SETINSLV,XL,20/CS: Brand: MEDLINE

## (undated) DEVICE — PADDING,UNDERCAST,COTTON, 4"X4YD STERILE: Brand: MEDLINE

## (undated) DEVICE — SUTURE NONABSORBABLE MONOFILAMENT 3-0 PS-1 18 IN BLK ETHILON 1663H

## (undated) DEVICE — SCREW BNE L12MM DIA3.5MM CORT S STL ST NONCANNULATED LOK: Type: IMPLANTABLE DEVICE | Site: ANKLE | Status: NON-FUNCTIONAL

## (undated) DEVICE — C-ARMOR C-ARM EQUIPMENT COVERS CLEAR STERILE UNIVERSAL FIT 12 PER CASE: Brand: C-ARMOR

## (undated) DEVICE — SUTURE VCRL SZ 2-0 L18IN ABSRB UD CT-1 L36MM 1/2 CIR J839D

## (undated) DEVICE — SCREW FIX L170MM DIA5MM S STL BLNT TRCR PNT MR CONDITIONAL

## (undated) DEVICE — BANDAGE COBAN 6 IN WND 6INX5YD FOAM

## (undated) DEVICE — BIT DRL QC 2.5X135 MM 45 MM CALIB NS

## (undated) DEVICE — SOLUTION SCRB 4OZ 4% CHG H2O AIDED FOR PREOPERATIVE SKIN

## (undated) DEVICE — POST EXT FIX 2 H WIRE

## (undated) DEVICE — GLOVE SURG SZ 65 THK91MIL LTX FREE SYN POLYISOPRENE

## (undated) DEVICE — BIT DRL L195MM DIA3.5MM QUIK CPL FOR PELV INSTR SET PRO-PAK

## (undated) DEVICE — DRAPE,U/ SHT,SPLIT,PLAS,STERIL: Brand: MEDLINE

## (undated) DEVICE — BOLT EXT FIX LNG CONN MR CONDITIONAL FOR DISTR OSTEOGENESIS

## (undated) DEVICE — SVMMC ORTH SPL DRP PK

## (undated) DEVICE — GLOVE SURG SZ 6 THK91MIL LTX FREE SYN POLYISOPRENE ANTI

## (undated) DEVICE — BANDAGE COMPR W6INXL12FT SMOOTH FOR LIMB EXSANG ESMARCH

## (undated) DEVICE — CYSTO/BLADDER IRRIGATION SET, REGULATING CLAMP

## (undated) DEVICE — DRAPE,REIN 53X77,STERILE: Brand: MEDLINE

## (undated) DEVICE — GOWN,AURORA,NONREINFORCED,LARGE: Brand: MEDLINE

## (undated) DEVICE — FOAM BUMP, LARGE: Brand: MEDLINE INDUSTRIES, INC.

## (undated) DEVICE — POST EXT FIX 1 H WIRE MR CONDITIONAL FOR DISTR OSTEOGENESIS

## (undated) DEVICE — BOLT EX FX MAXFRAME MULTIPAR PIN MNT

## (undated) DEVICE — GLOVE ORANGE PI 7   MSG9070

## (undated) DEVICE — PADDING CAST W6INXL4YD COT LO LINTING WYTEX

## (undated) DEVICE — BIT DRL QC 2X140 MM 60 MM CALIB NS

## (undated) DEVICE — BOLT EXT FIX OFFSET WIRE MR CONDITIONAL FOR DISTR